# Patient Record
Sex: FEMALE | Race: AMERICAN INDIAN OR ALASKA NATIVE | URBAN - METROPOLITAN AREA
[De-identification: names, ages, dates, MRNs, and addresses within clinical notes are randomized per-mention and may not be internally consistent; named-entity substitution may affect disease eponyms.]

---

## 2018-05-02 ENCOUNTER — OFFICE VISIT (OUTPATIENT)
Dept: URGENT CARE | Facility: URGENT CARE | Age: 23
End: 2018-05-02
Payer: COMMERCIAL

## 2018-05-02 VITALS
OXYGEN SATURATION: 98 % | DIASTOLIC BLOOD PRESSURE: 74 MMHG | SYSTOLIC BLOOD PRESSURE: 114 MMHG | HEART RATE: 111 BPM | RESPIRATION RATE: 16 BRPM | TEMPERATURE: 97.7 F

## 2018-05-02 DIAGNOSIS — M79.89 LEFT ARM SWELLING: ICD-10-CM

## 2018-05-02 DIAGNOSIS — T63.441A ALLERGIC REACTION TO BEE STING: Primary | ICD-10-CM

## 2018-05-02 PROCEDURE — 99214 OFFICE O/P EST MOD 30 MIN: CPT | Performed by: FAMILY MEDICINE

## 2018-05-02 RX ORDER — PREDNISONE 20 MG/1
40 TABLET ORAL DAILY
Qty: 8 TABLET | Refills: 0 | Status: SHIPPED | OUTPATIENT
Start: 2018-05-03 | End: 2018-05-07

## 2018-05-02 RX ORDER — DOXYCYCLINE 100 MG/1
CAPSULE ORAL 2 TIMES DAILY
COMMUNITY
End: 2018-12-28

## 2018-05-02 RX ORDER — DIPHENHYDRAMINE HCL 25 MG
25 TABLET ORAL ONCE
Qty: 1 TABLET | Refills: 0
Start: 2018-05-02 | End: 2018-05-02

## 2018-05-02 RX ORDER — PREDNISONE 20 MG/1
40 TABLET ORAL ONCE
Qty: 2 TABLET | Refills: 0
Start: 2018-05-02 | End: 2018-05-02

## 2018-05-02 NOTE — MR AVS SNAPSHOT
"              After Visit Summary   2018    Lulu Jimenez    MRN: [id number]           Patient Information     Date Of Birth          1995        Visit Information        Provider Department      2018 6:10 PM Uma Harris MD [facility] [place]        Today's Diagnoses     Allergic reaction to bee sting    -  1    Left arm swelling           Follow-ups after your visit        Who to contact     If you have questions or need follow up information about today's clinic visit or your schedule please contact [facility] directly at [phone].  Normal or non-critical lab and imaging results will be communicated to you by echoechohart, letter or phone within 4 business days after the clinic has received the results. If you do not hear from us within 7 days, please contact the clinic through echoechohart or phone. If you have a critical or abnormal lab result, we will notify you by phone as soon as possible.  Submit refill requests through [facility] or call your pharmacy and they will forward the refill request to us. Please allow 3 business days for your refill to be completed.          Additional Information About Your Visit        MyChart Information     [facility] lets you send messages to your doctor, view your test results, renew your prescriptions, schedule appointments and more. To sign up, go to [email or web address] . Click on \"Log in\" on the left side of the screen, which will take you to the Welcome page. Then click on \"Sign up Now\" on the right side of the page.     You will be asked to enter the access code listed below, as well as some personal information. Please follow the directions to create your username and password.     Your access code is: [email or web address]  Expires: 2018  7:28 PM     Your access code will  in 90 days. If you need help or a new code, please call your [facility] or [phone].        Care EveryWhere ID     This is your Care " EveryWhere ID. This could be used by other organizations to access your [place] medical records  [id number]        Your Vitals Were     Pulse Temperature Respirations Pulse Oximetry          111 97.7  F (36.5  C) (Oral) 16 98%         Blood Pressure from Last 3 Encounters:   05/02/18 114/74   04/15/14 115/76   03/31/12 100/60    Weight from Last 3 Encounters:   04/15/14 108 lb 3.9 oz (49.1 kg)   08/09/12 107 lb (48.5 kg) (12 %)*   03/31/12 102 lb (46.3 kg) (6 %)*     * Growth percentiles are based on [facility] 2-20 Years data.              Today, you had the following     No orders found for display         Today's Medication Changes          These changes are accurate as of 5/2/18  7:28 PM.  If you have any questions, ask your nurse or doctor.               Start taking these medicines.        Dose/Directions    diphenhydrAMINE 25 MG tablet   Commonly known as:  BENADRYL   Used for:  Allergic reaction to bee sting   Started by:  Uma Harris MD        Dose:  25 mg   Take 1 tablet (25 mg) by mouth once for 1 dose   Quantity:  1 tablet   Refills:  0       * predniSONE 20 MG tablet   Commonly known as:  DELTASONE   Used for:  Allergic reaction to bee sting   Started by:  Uma Harris MD        Dose:  40 mg   Take 2 tablets (40 mg) by mouth once for 1 dose   Quantity:  2 tablet   Refills:  0       * predniSONE 20 MG tablet   Commonly known as:  DELTASONE   Used for:  Allergic reaction to bee sting   Started by:  Uma Harris MD        Dose:  40 mg   Start taking on:  5/3/2018   Take 2 tablets (40 mg) by mouth daily for 4 days   Quantity:  8 tablet   Refills:  0       * Notice:  This list has 2 medication(s) that are the same as other medications prescribed for you. Read the directions carefully, and ask your doctor or other care provider to review them with you.         Where to get your medicines      These medications were [id number]   [address], [place] [address]     Phone:  [phone]     predniSONE 20 MG tablet         Some of these will need a paper prescription and others can be bought over the counter.  Ask your nurse if you have questions.     You don't need a prescription for these medications     diphenhydrAMINE 25 MG tablet    predniSONE 20 MG tablet                Primary Care Provider Office Phone # Fax #    Sanam Lemon [phone]       [facility] [address] [place] [address] [address]  [place] [address]        Equal Access to Services     [facility]: Hadii aad ku hadasho Soomaali, waaxda luqadaha, qaybta kaalmada adeegyada, waxay idiin hayaan nguyễn dueñas . So [facility] [phone].    ATENCIÓN: Si habla español, tiene a roach disposición servicios gratuitos de asistencia lingüística. [facility] [phone].    We comply with applicable federal civil rights laws and [place] laws. We do not discriminate on the basis of race, color, national origin, age, disability, sex, sexual orientation, or gender identity.            Thank you!     Thank you for choosing [facility]  for your care. Our goal is always to provide you with excellent care. Hearing back from our patients is one way we can continue to improve our services. Please take a few minutes to complete the written survey that you may receive in the mail after your visit with us. Thank you!             Your Updated Medication List - Protect others around you: Learn how to safely use, store and throw away your medicines at www.disposemymeds.org.          This list is accurate as of 5/2/18  7:28 PM.  Always use your most recent med list.                   Brand Name Dispense Instructions for use Diagnosis    diphenhydrAMINE 25 MG tablet    BENADRYL    1 tablet    Take 1 tablet (25 mg) by mouth once for 1 dose    Allergic reaction to bee sting       doxycycline 100 MG capsule    VIBRAMYCIN     Take by mouth 2  times daily        NUVARING 0.12-0.015 MG/24HR vaginal ring   Generic drug:  etonogestrel-ethinyl estradiol      Place 1 each vaginally every 28 days        * predniSONE 20 MG tablet    DELTASONE    2 tablet    Take 2 tablets (40 mg) by mouth once for 1 dose    Allergic reaction to bee sting       * predniSONE 20 MG tablet   Start taking on:  5/3/2018    DELTASONE    8 tablet    Take 2 tablets (40 mg) by mouth daily for 4 days    Allergic reaction to bee sting       * Notice:  This list has 2 medication(s) that are the same as other medications prescribed for you. Read the directions carefully, and ask your doctor or other care provider to review them with you.

## 2018-05-03 NOTE — PROGRESS NOTES
Chief Complaint   Patient presents with     Urgent Care     Bee sting on Sunday.  Pt c/o redness and swelling left arm     SUBJECTIVE:  Lulu Jimenez, a 22 year old female scheduled an appointment to discuss the following issues:     Allergic reaction to bee sting  Left arm swelling     She is here as was stung by a bee 3 days back in the left arm inner aspect following which she has swelling and redness in her left arm and left forearm area along with left elbow swelling too  She has been noticing a lot of itching and also swelling in the area   Denies any fever or chills , was seen in [facility] clinic yesterday and started on doxycycline   She has been taking it for last 1 day but has not been taking benadryl every 4-6 hrs  Discussed with pt to start taking benadryl every 4-6 hrs   Started on prednisone , was given a dose in the UC along with benadryl     No past medical history on file.   No past surgical history on file.     Social History     Social History     Marital status: Single     Spouse name: N/A     Number of children: N/A     Years of education: N/A     Occupational History     Not on file.     Social History Main Topics     Smoking status: Never Smoker     Smokeless tobacco: Never Used     Alcohol use Not on file     Drug use: Not on file     Sexual activity: Not on file     Other Topics Concern     Not on file     Social History Narrative        Current Outpatient Prescriptions   Medication Sig Dispense Refill     diphenhydrAMINE (BENADRYL) 25 MG tablet Take 1 tablet (25 mg) by mouth once for 1 dose 1 tablet 0     doxycycline (VIBRAMYCIN) 100 MG capsule Take by mouth 2 times daily       etonogestrel-ethinyl estradiol (NUVARING) 0.12-0.015 MG/24HR vaginal ring Place 1 each vaginally every 28 days       [START ON 5/3/2018] predniSONE (DELTASONE) 20 MG tablet Take 2 tablets (40 mg) by mouth daily for 4 days 8 tablet 0     predniSONE (DELTASONE) 20 MG tablet Take 2 tablets (40 mg) by mouth once for 1  dose 2 tablet 0       Health Maintenance   Topic Date Due     CHLAMYDIA SCREENING  1995     HPV IMMUNIZATION (1 of 3 - Female 3 Dose Series) 07/12/2004     TETANUS IMMUNIZATION (SYSTEM ASSIGNED)  07/12/2011     HIV SCREEN (SYSTEM ASSIGNED)  07/12/2011     PAP SCREENING Q3 YR (SYSTEM ASSIGNED)  07/12/2014     INFLUENZA VACCINE (Season Ended) 09/01/2018        ROS:  CONSTITUTIONAL:no fever, no chills or sweats, no excessive fatigue, no significant change in weight  CV: neg   RESP -neg  GI:  Neg   NEURO: neg   MSK - neg   Skin - neg   Pyschiatry-neg     OBJECTIVE:  /74  Pulse 111  Temp 97.7  F (36.5  C) (Oral)  Resp 16  SpO2 98%      EXAM:  GENERAL APPEARANCE: healthy, alert and no distress  EYES: EOMI,  PERRL  HENT: ear canals and TM's normal and nose and mouth without ulcers or lesions  RESP: lungs clear to auscultation - no rales, rhonchi or wheezes  CV: regular rates and rhythm, normal S1 S2, no S3 or S4 and no murmur, click or rub -  ABDOMEN:  soft, nontender, no HSM or masses and bowel sounds normal  SKIN: left arm -extensive swelling with redness with no streaking noted , there is associated warmth noted , the redness extends to the palmar aspect of the forearm and also the left elbow lateral aspect   PSYCH: mentation appears normal and affect normal/bright        ASSESSMENT/PLAN:  Lulu was seen today for urgent care.    Diagnoses and all orders for this visit:    Allergic reaction to bee sting  -     predniSONE (DELTASONE) 20 MG tablet; Take 2 tablets (40 mg) by mouth daily for 4 days  -     predniSONE (DELTASONE) 20 MG tablet; Take 2 tablets (40 mg) by mouth once for 1 dose  -     diphenhydrAMINE (BENADRYL) 25 MG tablet; Take 1 tablet (25 mg) by mouth once for 1 dose    Left arm swelling      Discussed with pt that the symptoms seem to be related to allergic reaction   If swelling and redness worsens should follow up   Advised to continue on benadryl 12.5 mg every 4-6 hrs   Zyrtec during the  day   Continue on doxycycline started at the minute clinic   Apply calamine lotion to area   Follow up if  symptoms fail to improve or worsens   Pt understood and agreed with plan       Spent>25 minutes with patient and > 50% of the time was for counselling       Uma Harris MD

## 2018-12-03 ENCOUNTER — DOCUMENTATION ONLY (OUTPATIENT)
Dept: PEDIATRIC HEMATOLOGY/ONCOLOGY | Facility: CLINIC | Age: 23
End: 2018-12-03

## 2018-12-03 NOTE — TELEPHONE ENCOUNTER
Records from [facility] were sent to [facility] to be scanned into the patient's chart.    Osiris Rasheed CMA December 3, 2018

## 2018-12-28 ENCOUNTER — OFFICE VISIT (OUTPATIENT)
Dept: FAMILY MEDICINE | Facility: CLINIC | Age: 23
End: 2018-12-28
Payer: COMMERCIAL

## 2018-12-28 VITALS
HEIGHT: 59 IN | BODY MASS INDEX: 21.75 KG/M2 | SYSTOLIC BLOOD PRESSURE: 128 MMHG | WEIGHT: 107.9 LBS | DIASTOLIC BLOOD PRESSURE: 76 MMHG | TEMPERATURE: 99.1 F | HEART RATE: 107 BPM | OXYGEN SATURATION: 97 %

## 2018-12-28 DIAGNOSIS — H61.21 IMPACTED CERUMEN OF RIGHT EAR: ICD-10-CM

## 2018-12-28 DIAGNOSIS — J06.9 VIRAL URI WITH COUGH: Primary | ICD-10-CM

## 2018-12-28 PROCEDURE — 69209 REMOVE IMPACTED EAR WAX UNI: CPT | Mod: RT | Performed by: NURSE PRACTITIONER

## 2018-12-28 PROCEDURE — 99212 OFFICE O/P EST SF 10 MIN: CPT | Mod: 25 | Performed by: NURSE PRACTITIONER

## 2018-12-28 ASSESSMENT — MIFFLIN-ST. JEOR: SCORE: 1144.02

## 2018-12-28 NOTE — PROGRESS NOTES
"HPI    SUBJECTIVE:   Lulu Jimenez is a 23 year old female who presents to clinic today for the following health issues:      RESPIRATORY SYMPTOMS      Duration: 1 week    Description  nasal congestion, rhinorrhea, facial pain/pressure, cough, fever and ear pain right    Severity: moderate    Accompanying signs and symptoms: None    History (predisposing factors):  none    Precipitating or alleviating factors: None    Therapies tried and outcome:  rest and fluids      Had a cold last week, got better then got bad again this last weekend 6 days ago   Coughing with some phlgem   Still has some throat pain   No fevers anymore   Has some sick contacts     No past medical history on file.  No family history on file.  No past surgical history on file.  Social History     Tobacco Use     Smoking status: Never Smoker     Smokeless tobacco: Never Used   Substance Use Topics     Alcohol use: Not on file     Current Outpatient Medications   Medication Sig Dispense Refill     etonogestrel-ethinyl estradiol (NUVARING) 0.12-0.015 MG/24HR vaginal ring Place 1 each vaginally every 28 days       Allergies   Allergen Reactions     Bactrim [Sulfamethoxazole W-Trimethoprim]      Rash and dyspnea.       Reviewed and updated as needed this visit by clinical staff and provider       ROS  Detailed as above     /76 (BP Location: Right arm, Patient Position: Chair, Cuff Size: Adult Regular)   Pulse 107   Temp 99.1  F (37.3  C) (Tympanic)   Ht 1.505 m (4' 11.25\")   Wt 48.9 kg (107 lb 14.4 oz)   LMP 12/23/2018   SpO2 97%   BMI 21.61 kg/m        Physical Exam   Constitutional: She is oriented to person, place, and time. She appears well-developed.   HENT:   Head: Normocephalic.   Right Ear: Tympanic membrane, external ear and ear canal normal.   Left Ear: Tympanic membrane, external ear and ear canal normal.   Mouth/Throat: Oropharynx is clear and moist. No oropharyngeal exudate.   Right cerumen impaction   Eyes: Conjunctivae " are normal.   Neck: Normal range of motion.   Cardiovascular: Normal rate, regular rhythm and normal heart sounds.   No murmur heard.  Pulmonary/Chest: Effort normal and breath sounds normal. No respiratory distress.   Lymphadenopathy:     She has no cervical adenopathy.   Neurological: She is alert and oriented to person, place, and time.   Skin: Skin is warm and dry.   Psychiatric: She has a normal mood and affect. Judgment normal.       Assessment and Plan:       ICD-10-CM    1. Viral URI with cough J06.9     B97.89    2. Impacted cerumen of right ear H61.21 HC REMOVAL IMPACTED CERUMEN IRRIGATION/LVG UNILAT       Looks great on exam. Suspect viral etiology. Discussed Symptomatic treatments   CMA completed cerumen removal by lavage     ELISA Weiner, CNP  [facility]

## 2019-01-02 NOTE — NURSING NOTE
Patient identified using two patient identifiers.  Ear exam showing wax occlusion completed by provider.  Solution: warm water was placed in the right ear(s) via Irrigation tool   Kimberly Kaufman CMA  .

## 2019-02-15 ENCOUNTER — HEALTH MAINTENANCE LETTER (OUTPATIENT)
Age: 24
End: 2019-02-15

## 2019-11-08 ENCOUNTER — HEALTH MAINTENANCE LETTER (OUTPATIENT)
Age: 24
End: 2019-11-08

## 2020-02-23 ENCOUNTER — HEALTH MAINTENANCE LETTER (OUTPATIENT)
Age: 25
End: 2020-02-23

## 2020-12-06 ENCOUNTER — HEALTH MAINTENANCE LETTER (OUTPATIENT)
Age: 25
End: 2020-12-06

## 2021-09-25 ENCOUNTER — HEALTH MAINTENANCE LETTER (OUTPATIENT)
Age: 26
End: 2021-09-25

## 2022-01-15 ENCOUNTER — HEALTH MAINTENANCE LETTER (OUTPATIENT)
Age: 27
End: 2022-01-15

## 2022-08-08 ENCOUNTER — LAB REQUISITION (OUTPATIENT)
Dept: LAB | Facility: CLINIC | Age: 27
End: 2022-08-08
Payer: COMMERCIAL

## 2022-08-08 DIAGNOSIS — Z31.69 ENCOUNTER FOR OTHER GENERAL COUNSELING AND ADVICE ON PROCREATION: ICD-10-CM

## 2022-08-08 DIAGNOSIS — Z01.419 ENCOUNTER FOR GYNECOLOGICAL EXAMINATION (GENERAL) (ROUTINE) WITHOUT ABNORMAL FINDINGS: ICD-10-CM

## 2022-08-08 PROCEDURE — 86787 VARICELLA-ZOSTER ANTIBODY: CPT | Mod: ORL | Performed by: PHYSICIAN ASSISTANT

## 2022-08-08 PROCEDURE — G0145 SCR C/V CYTO,THINLAYER,RESCR: HCPCS | Mod: ORL | Performed by: PHYSICIAN ASSISTANT

## 2022-08-08 PROCEDURE — G0124 SCREEN C/V THIN LAYER BY MD: HCPCS | Performed by: PATHOLOGY

## 2022-08-08 PROCEDURE — 86762 RUBELLA ANTIBODY: CPT | Mod: ORL | Performed by: PHYSICIAN ASSISTANT

## 2022-08-09 LAB
RUBV IGG SERPL QL IA: 0.83 INDEX
RUBV IGG SERPL QL IA: NORMAL
VZV IGG SER QL IA: 779 INDEX
VZV IGG SER QL IA: POSITIVE

## 2022-08-12 LAB
BKR LAB AP GYN ADEQUACY: ABNORMAL
BKR LAB AP GYN INTERPRETATION: ABNORMAL
BKR LAB AP HPV REFLEX: ABNORMAL
BKR LAB AP LMP: ABNORMAL
BKR LAB AP PREVIOUS ABNL DX: ABNORMAL
BKR LAB AP PREVIOUS ABNORMAL: ABNORMAL
PATH REPORT.COMMENTS IMP SPEC: ABNORMAL
PATH REPORT.COMMENTS IMP SPEC: ABNORMAL
PATH REPORT.RELEVANT HX SPEC: ABNORMAL

## 2022-09-02 ENCOUNTER — LAB REQUISITION (OUTPATIENT)
Dept: LAB | Facility: CLINIC | Age: 27
End: 2022-09-02
Payer: COMMERCIAL

## 2022-09-02 DIAGNOSIS — R87.610 ATYPICAL SQUAMOUS CELLS OF UNDETERMINED SIGNIFICANCE ON CYTOLOGIC SMEAR OF CERVIX (ASC-US): ICD-10-CM

## 2022-09-02 PROCEDURE — 88305 TISSUE EXAM BY PATHOLOGIST: CPT | Mod: 26 | Performed by: PATHOLOGY

## 2022-09-02 PROCEDURE — 88305 TISSUE EXAM BY PATHOLOGIST: CPT | Mod: TC,ORL | Performed by: OBSTETRICS & GYNECOLOGY

## 2022-09-07 LAB
PATH REPORT.COMMENTS IMP SPEC: NORMAL
PATH REPORT.COMMENTS IMP SPEC: NORMAL
PATH REPORT.FINAL DX SPEC: NORMAL
PATH REPORT.GROSS SPEC: NORMAL
PATH REPORT.MICROSCOPIC SPEC OTHER STN: NORMAL
PATH REPORT.RELEVANT HX SPEC: NORMAL
PHOTO IMAGE: NORMAL

## 2023-01-07 ENCOUNTER — HEALTH MAINTENANCE LETTER (OUTPATIENT)
Age: 28
End: 2023-01-07

## 2023-04-22 ENCOUNTER — HEALTH MAINTENANCE LETTER (OUTPATIENT)
Age: 28
End: 2023-04-22

## 2023-05-16 ENCOUNTER — LAB REQUISITION (OUTPATIENT)
Dept: LAB | Facility: CLINIC | Age: 28
End: 2023-05-16
Payer: COMMERCIAL
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
## 2023-05-16 PROCEDURE — 86618 LYME DISEASE ANTIBODY: CPT | Mod: ORL | Performed by: PHYSICIAN ASSISTANT

## 2023-05-17 LAB — B BURGDOR IGG+IGM SER QL: 0.35

## 2023-06-13 LAB
HEPATITIS B SURFACE ANTIGEN (EXTERNAL): NEGATIVE
RUBELLA ANTIBODY IGG (EXTERNAL): NORMAL

## 2023-08-13 LAB — HIV1+2 AB SERPL QL IA: NEGATIVE

## 2023-11-27 LAB — GROUP B STREPTOCOCCUS (EXTERNAL): NEGATIVE

## 2023-12-10 ENCOUNTER — HOSPITAL ENCOUNTER (INPATIENT)
Facility: CLINIC | Age: 28
LOS: 5 days | Discharge: HOME HEALTH | End: 2023-12-15
Attending: OBSTETRICS & GYNECOLOGY | Admitting: OBSTETRICS & GYNECOLOGY
Payer: COMMERCIAL

## 2023-12-10 DIAGNOSIS — R60.0 BILATERAL LOWER EXTREMITY EDEMA: ICD-10-CM

## 2023-12-10 DIAGNOSIS — Z98.891 STATUS POST PRIMARY LOW TRANSVERSE CESAREAN SECTION: ICD-10-CM

## 2023-12-10 PROBLEM — Z34.90 ENCOUNTER FOR INDUCTION OF LABOR: Status: ACTIVE | Noted: 2023-12-10

## 2023-12-10 LAB
ABO/RH(D): NORMAL
ANTIBODY SCREEN: NEGATIVE
ERYTHROCYTE [DISTWIDTH] IN BLOOD BY AUTOMATED COUNT: 12.6 % (ref 10–15)
HCT VFR BLD AUTO: 34 % (ref 35–47)
HGB BLD-MCNC: 11.7 G/DL (ref 11.7–15.7)
MCH RBC QN AUTO: 33 PG (ref 26.5–33)
MCHC RBC AUTO-ENTMCNC: 34.4 G/DL (ref 31.5–36.5)
MCV RBC AUTO: 96 FL (ref 78–100)
PLATELET # BLD AUTO: 185 10E3/UL (ref 150–450)
RBC # BLD AUTO: 3.55 10E6/UL (ref 3.8–5.2)
SPECIMEN EXPIRATION DATE: NORMAL
WBC # BLD AUTO: 6.8 10E3/UL (ref 4–11)

## 2023-12-10 PROCEDURE — 36415 COLL VENOUS BLD VENIPUNCTURE: CPT | Performed by: OBSTETRICS & GYNECOLOGY

## 2023-12-10 PROCEDURE — 120N000001 HC R&B MED SURG/OB

## 2023-12-10 PROCEDURE — 86780 TREPONEMA PALLIDUM: CPT | Performed by: OBSTETRICS & GYNECOLOGY

## 2023-12-10 PROCEDURE — 250N000013 HC RX MED GY IP 250 OP 250 PS 637: Performed by: OBSTETRICS & GYNECOLOGY

## 2023-12-10 PROCEDURE — 85027 COMPLETE CBC AUTOMATED: CPT | Performed by: OBSTETRICS & GYNECOLOGY

## 2023-12-10 PROCEDURE — 86901 BLOOD TYPING SEROLOGIC RH(D): CPT | Performed by: OBSTETRICS & GYNECOLOGY

## 2023-12-10 PROCEDURE — 86850 RBC ANTIBODY SCREEN: CPT | Performed by: OBSTETRICS & GYNECOLOGY

## 2023-12-10 RX ORDER — KETOROLAC TROMETHAMINE 30 MG/ML
30 INJECTION, SOLUTION INTRAMUSCULAR; INTRAVENOUS
Status: DISCONTINUED | OUTPATIENT
Start: 2023-12-10 | End: 2023-12-12

## 2023-12-10 RX ORDER — NALOXONE HYDROCHLORIDE 0.4 MG/ML
0.4 INJECTION, SOLUTION INTRAMUSCULAR; INTRAVENOUS; SUBCUTANEOUS
Status: DISCONTINUED | OUTPATIENT
Start: 2023-12-10 | End: 2023-12-12 | Stop reason: HOSPADM

## 2023-12-10 RX ORDER — ONDANSETRON 2 MG/ML
4 INJECTION INTRAMUSCULAR; INTRAVENOUS EVERY 6 HOURS PRN
Status: DISCONTINUED | OUTPATIENT
Start: 2023-12-10 | End: 2023-12-12 | Stop reason: HOSPADM

## 2023-12-10 RX ORDER — NALOXONE HYDROCHLORIDE 0.4 MG/ML
0.2 INJECTION, SOLUTION INTRAMUSCULAR; INTRAVENOUS; SUBCUTANEOUS
Status: DISCONTINUED | OUTPATIENT
Start: 2023-12-10 | End: 2023-12-12 | Stop reason: HOSPADM

## 2023-12-10 RX ORDER — TRANEXAMIC ACID 10 MG/ML
1 INJECTION, SOLUTION INTRAVENOUS EVERY 30 MIN PRN
Status: DISCONTINUED | OUTPATIENT
Start: 2023-12-10 | End: 2023-12-12 | Stop reason: HOSPADM

## 2023-12-10 RX ORDER — ONDANSETRON 4 MG/1
4 TABLET, ORALLY DISINTEGRATING ORAL EVERY 6 HOURS PRN
Status: DISCONTINUED | OUTPATIENT
Start: 2023-12-10 | End: 2023-12-12 | Stop reason: HOSPADM

## 2023-12-10 RX ORDER — OXYTOCIN/0.9 % SODIUM CHLORIDE 30/500 ML
100-340 PLASTIC BAG, INJECTION (ML) INTRAVENOUS CONTINUOUS PRN
Status: DISCONTINUED | OUTPATIENT
Start: 2023-12-10 | End: 2023-12-12

## 2023-12-10 RX ORDER — OXYTOCIN/0.9 % SODIUM CHLORIDE 30/500 ML
340 PLASTIC BAG, INJECTION (ML) INTRAVENOUS CONTINUOUS PRN
Status: DISCONTINUED | OUTPATIENT
Start: 2023-12-10 | End: 2023-12-12 | Stop reason: HOSPADM

## 2023-12-10 RX ORDER — MISOPROSTOL 200 UG/1
400 TABLET ORAL
Status: DISCONTINUED | OUTPATIENT
Start: 2023-12-10 | End: 2023-12-12 | Stop reason: HOSPADM

## 2023-12-10 RX ORDER — MISOPROSTOL 200 UG/1
800 TABLET ORAL
Status: DISCONTINUED | OUTPATIENT
Start: 2023-12-10 | End: 2023-12-12 | Stop reason: HOSPADM

## 2023-12-10 RX ORDER — DIPHENHYDRAMINE HCL 25 MG
25 CAPSULE ORAL EVERY 6 HOURS PRN
COMMUNITY

## 2023-12-10 RX ORDER — PROCHLORPERAZINE MALEATE 5 MG
10 TABLET ORAL EVERY 6 HOURS PRN
Status: DISCONTINUED | OUTPATIENT
Start: 2023-12-10 | End: 2023-12-12 | Stop reason: HOSPADM

## 2023-12-10 RX ORDER — CARBOPROST TROMETHAMINE 250 UG/ML
250 INJECTION, SOLUTION INTRAMUSCULAR
Status: DISCONTINUED | OUTPATIENT
Start: 2023-12-10 | End: 2023-12-12 | Stop reason: HOSPADM

## 2023-12-10 RX ORDER — HYDROXYZINE HYDROCHLORIDE 25 MG/1
50 TABLET, FILM COATED ORAL
Status: DISCONTINUED | OUTPATIENT
Start: 2023-12-10 | End: 2023-12-12 | Stop reason: HOSPADM

## 2023-12-10 RX ORDER — OXYTOCIN 10 [USP'U]/ML
10 INJECTION, SOLUTION INTRAMUSCULAR; INTRAVENOUS
Status: DISCONTINUED | OUTPATIENT
Start: 2023-12-10 | End: 2023-12-12

## 2023-12-10 RX ORDER — METOCLOPRAMIDE 10 MG/1
10 TABLET ORAL EVERY 6 HOURS PRN
Status: DISCONTINUED | OUTPATIENT
Start: 2023-12-10 | End: 2023-12-12 | Stop reason: HOSPADM

## 2023-12-10 RX ORDER — FERROUS SULFATE 325(65) MG
325 TABLET ORAL
Status: ON HOLD | COMMUNITY
End: 2023-12-15

## 2023-12-10 RX ORDER — CITRIC ACID/SODIUM CITRATE 334-500MG
30 SOLUTION, ORAL ORAL
Status: DISCONTINUED | OUTPATIENT
Start: 2023-12-10 | End: 2023-12-12 | Stop reason: HOSPADM

## 2023-12-10 RX ORDER — METHYLERGONOVINE MALEATE 0.2 MG/ML
200 INJECTION INTRAVENOUS
Status: DISCONTINUED | OUTPATIENT
Start: 2023-12-10 | End: 2023-12-12 | Stop reason: HOSPADM

## 2023-12-10 RX ORDER — CETIRIZINE HYDROCHLORIDE 5 MG/1
5 TABLET ORAL DAILY
COMMUNITY

## 2023-12-10 RX ORDER — METOCLOPRAMIDE HYDROCHLORIDE 5 MG/ML
10 INJECTION INTRAMUSCULAR; INTRAVENOUS EVERY 6 HOURS PRN
Status: DISCONTINUED | OUTPATIENT
Start: 2023-12-10 | End: 2023-12-12 | Stop reason: HOSPADM

## 2023-12-10 RX ORDER — PROCHLORPERAZINE 25 MG
25 SUPPOSITORY, RECTAL RECTAL EVERY 12 HOURS PRN
Status: DISCONTINUED | OUTPATIENT
Start: 2023-12-10 | End: 2023-12-12 | Stop reason: HOSPADM

## 2023-12-10 RX ORDER — FENTANYL CITRATE 50 UG/ML
50 INJECTION, SOLUTION INTRAMUSCULAR; INTRAVENOUS EVERY 30 MIN PRN
Status: DISCONTINUED | OUTPATIENT
Start: 2023-12-10 | End: 2023-12-12 | Stop reason: HOSPADM

## 2023-12-10 RX ORDER — ACETAMINOPHEN 325 MG/1
650 TABLET ORAL EVERY 4 HOURS PRN
Status: DISCONTINUED | OUTPATIENT
Start: 2023-12-10 | End: 2023-12-12

## 2023-12-10 RX ORDER — IBUPROFEN 400 MG/1
800 TABLET, FILM COATED ORAL
Status: DISCONTINUED | OUTPATIENT
Start: 2023-12-10 | End: 2023-12-12

## 2023-12-10 RX ORDER — VITAMIN A ACETATE, .BETA.-CAROTENE, ASCORBIC ACID, CHOLECALCIFEROL, .ALPHA.-TOCOPHEROL ACETATE, DL-, THIAMINE MONONITRATE, RIBOFLAVIN, NIACINAMIDE, PYRIDOXINE HYDROCHLORIDE, FOLIC ACID, CYANOCOBALAMIN, CALCIUM CARBONATE, FERROUS FUMARATE, ZINC OXIDE, AND CUPRIC OXIDE 2000; 2000; 120; 400; 22; 1.84; 3; 20; 10; 1; 12; 200; 27; 25; 2 [IU]/1; [IU]/1; MG/1; [IU]/1; MG/1; MG/1; MG/1; MG/1; MG/1; MG/1; UG/1; MG/1; MG/1; MG/1; MG/1
1 TABLET ORAL DAILY
COMMUNITY

## 2023-12-10 RX ORDER — OXYTOCIN 10 [USP'U]/ML
10 INJECTION, SOLUTION INTRAMUSCULAR; INTRAVENOUS
Status: DISCONTINUED | OUTPATIENT
Start: 2023-12-10 | End: 2023-12-12 | Stop reason: HOSPADM

## 2023-12-10 RX ADMIN — DINOPROSTONE 10 MG: 10 INSERT VAGINAL at 21:24

## 2023-12-10 RX ADMIN — HYDROXYZINE HYDROCHLORIDE 50 MG: 25 TABLET, FILM COATED ORAL at 22:17

## 2023-12-10 RX ADMIN — ACETAMINOPHEN 650 MG: 325 TABLET, FILM COATED ORAL at 22:46

## 2023-12-10 ASSESSMENT — ACTIVITIES OF DAILY LIVING (ADL)
ADLS_ACUITY_SCORE: 18
ADLS_ACUITY_SCORE: 18

## 2023-12-11 LAB — T PALLIDUM AB SER QL: NONREACTIVE

## 2023-12-11 PROCEDURE — 258N000003 HC RX IP 258 OP 636: Performed by: OBSTETRICS & GYNECOLOGY

## 2023-12-11 PROCEDURE — 120N000001 HC R&B MED SURG/OB

## 2023-12-11 PROCEDURE — 250N000011 HC RX IP 250 OP 636: Performed by: OBSTETRICS & GYNECOLOGY

## 2023-12-11 PROCEDURE — 250N000009 HC RX 250: Performed by: OBSTETRICS & GYNECOLOGY

## 2023-12-11 PROCEDURE — 3E0P7VZ INTRODUCTION OF HORMONE INTO FEMALE REPRODUCTIVE, VIA NATURAL OR ARTIFICIAL OPENING: ICD-10-PCS | Performed by: OBSTETRICS & GYNECOLOGY

## 2023-12-11 PROCEDURE — 250N000013 HC RX MED GY IP 250 OP 250 PS 637: Performed by: OBSTETRICS & GYNECOLOGY

## 2023-12-11 RX ORDER — OXYTOCIN/0.9 % SODIUM CHLORIDE 30/500 ML
1-24 PLASTIC BAG, INJECTION (ML) INTRAVENOUS CONTINUOUS
Status: DISCONTINUED | OUTPATIENT
Start: 2023-12-11 | End: 2023-12-12 | Stop reason: HOSPADM

## 2023-12-11 RX ORDER — SODIUM CHLORIDE, SODIUM LACTATE, POTASSIUM CHLORIDE, CALCIUM CHLORIDE 600; 310; 30; 20 MG/100ML; MG/100ML; MG/100ML; MG/100ML
INJECTION, SOLUTION INTRAVENOUS CONTINUOUS PRN
Status: DISCONTINUED | OUTPATIENT
Start: 2023-12-11 | End: 2023-12-12 | Stop reason: HOSPADM

## 2023-12-11 RX ORDER — LIDOCAINE 40 MG/G
CREAM TOPICAL
Status: DISCONTINUED | OUTPATIENT
Start: 2023-12-11 | End: 2023-12-12 | Stop reason: HOSPADM

## 2023-12-11 RX ORDER — TERBUTALINE SULFATE 1 MG/ML
0.25 INJECTION, SOLUTION SUBCUTANEOUS
Status: DISCONTINUED | OUTPATIENT
Start: 2023-12-11 | End: 2023-12-12 | Stop reason: HOSPADM

## 2023-12-11 RX ADMIN — Medication 2 MILLI-UNITS/MIN: at 10:18

## 2023-12-11 RX ADMIN — HYDROXYZINE HYDROCHLORIDE 50 MG: 25 TABLET, FILM COATED ORAL at 01:44

## 2023-12-11 RX ADMIN — SODIUM CHLORIDE, POTASSIUM CHLORIDE, SODIUM LACTATE AND CALCIUM CHLORIDE 75 ML/HR: 600; 310; 30; 20 INJECTION, SOLUTION INTRAVENOUS at 10:26

## 2023-12-11 RX ADMIN — SODIUM CHLORIDE, POTASSIUM CHLORIDE, SODIUM LACTATE AND CALCIUM CHLORIDE: 600; 310; 30; 20 INJECTION, SOLUTION INTRAVENOUS at 21:00

## 2023-12-11 RX ADMIN — FENTANYL CITRATE 50 MCG: 50 INJECTION, SOLUTION INTRAMUSCULAR; INTRAVENOUS at 22:47

## 2023-12-11 RX ADMIN — FENTANYL CITRATE 50 MCG: 50 INJECTION, SOLUTION INTRAMUSCULAR; INTRAVENOUS at 22:52

## 2023-12-11 ASSESSMENT — ACTIVITIES OF DAILY LIVING (ADL)
ADLS_ACUITY_SCORE: 18

## 2023-12-11 NOTE — PLAN OF CARE
Pt arrived at 1945 to room 212 with  Gino. Oriented to room and call light setting, placed on external monitors with consent.    Admissions questions discussed, also discussed induction process with patient. Pt in agreement with plan for cervidil. Dr Schwab stopped in to see the patient, discussed plan of care with her and answered pt and 's questions. Dr Schwab placed orders, Dr Rodriguez paged that pt had arrived.    18g PIV placed in R hand, SL. Pt being induced for GHTN. Cx pattern irregular, FHT category 1 with moderate variability, baseline HR 140s, + accels, - decels. Continuing with plan of care.

## 2023-12-11 NOTE — PROGRESS NOTES
S: breathing through contractions. Using Susannah for fetal monitoring, pt up and moving around     O:  /84   Pulse (!) 125   Temp (P) 97.7  F (36.5  C) (Temporal)   Resp 18   LMP 03/17/2023     SVE 2/50/-2/posterior/soft    Cook catheter placed. 80 ml fluid placed in uterine balloon, 40 mL in vaginal balloon. Patient tolerated intrauterine balloon placement well    FHR category 1    [place] contractions every 2-5 minutes    Pitocin at 6 miU/min    A/P: 28 year old G1 at 38w3d, IOL for GHTN     S/p cervidil, now with cook catheter. Will continue with low dose pitocin as per protocol    Jennifer L. Schwab, MD

## 2023-12-11 NOTE — PROVIDER NOTIFICATION
12/10/23 2043   Provider Notification   Provider Name/Title Dr Rodriguez   Method of Notification Electronic Page   Request Evaluate - Remote   Notification Reason Patient Arrived     Lulu arrived for CR of Schwab's. Plan for cervidil, Dr Schwab placed orders. No need to call, I will keep you updated. Thanks!

## 2023-12-11 NOTE — PROGRESS NOTES
S: starting to feel uncomfortable with contractions    O:  /62 (BP Location: Left arm, Patient Position: Supine, Cuff Size: Adult Regular)   Pulse (!) 125   Temp 97.9  F (36.6  C) (Temporal)   Resp 16   LMP 03/17/2023     SVE: cervidil removed. 1.5/50%/-2/posterior/soft    FHR: 130 moderate variability +accelerations no decelerations    Contractions: every 2-5 minutes    A/P: 28 year old G1 at 38w3d, IOL for GHTN    S/p cervidil, will start pitocin as per protocol    Reviewed options for pain control.     Reactive FHR    Normotensive     Jennifer L. Schwab, MD

## 2023-12-11 NOTE — H&P
"  2023    Lulu Trujillo  [id number]            OB Admit History & Physical      Ms. Barbara Trujillo  is here for induction of labor for gestational hypertension.    She has noticed no contractions, LOF or VB. Good FM.  She feels well.    Patient's last menstrual period was 2023.   Her Estimated Date of Delivery: Dec 22, 2023  , making her 38w3d  wks.      Estimated body mass index is 21.61 kg/m  as calculated from the following:    Height as of 18: 1.505 m (4' 11.25\").    Weight as of 18: 48.9 kg (107 lb 14.4 oz).  Her prenatal course has been relatively uncomplicated. Late transfer of care from [facility] due to gestational hypertension. Repeat labs at [facility] [place] last week notable for borderline elevated UPC of 0.292, otherwise normal. Her BP has  Been nortomotensive to mildly elevated with diastolics in the 90s.    See prenatal for labs.   GBBS, Rubella Immune, RH positive    Estimated fetal weight= 3300 gm       She is a 28 year old   Her OB history:   OB History    Para Term  AB Living   1 0 0 0 0 0   SAB IAB Ectopic Multiple Live Births   0 0 0 0 0      # Outcome Date GA Lbr Alexi/2nd Weight Sex Delivery Anes PTL Lv   1 Current                   History reviewed. No pertinent past medical history.       Past Surgical History:   Procedure Laterality Date     ADENOIDECTOMY      4 years old     RHINOPLASTY      age 14         No current outpatient medications on file.       Allergies: Bactrim [sulfamethoxazole w-trimethoprim]      REVIEW OF SYSTEMS:  NEUROLOGIC:  Negative  EYES:  Negative  ENT:  Negative  GI:  Negative  BREAST:  Negative  :  Negative  GYN:  Negative  CV:  Negative  PULMONARY:  Negative  MUSCULOSKELETAL:  Negative  PSYCH:  Negative        Social History     Socioeconomic History     Marital status: Single     Spouse name: Not on file     Number of children: Not on file     Years of education: Not on file     Highest " education level: Not on file   Occupational History     Not on file   Tobacco Use     Smoking status: Never     Smokeless tobacco: Never   Substance and Sexual Activity     Alcohol use: Not Currently     Drug use: Never     Sexual activity: Yes     Partners: Male   Other Topics Concern     Not on file   Social History Narrative     Not on file     Social Determinants of Health     Financial Resource Strain: Not on file   Food Insecurity: Not on file   Transportation Needs: Not on file   Physical Activity: Not on file   Stress: Not on file   Social Connections: Not on file   Interpersonal Safety: Not on file   Housing Stability: Not on file      History reviewed. No pertinent family history.          Vitals:    mod edis +accel no decel  With contractions every  2-5 min    Alert Awake in NAD  HEENT grossly normal  Neck: no lymphadenopathy or thryoidomegaly  Lungs CTAB  Back no spinal or CVAT  Heart RRR  ABD gravid, nontender on exam with vertex palpable  Pelvic:  no fluid noted, no blood noted  Cervix is 2 cm / 50 % effaced at -2 station  EXT:  no edema or calf tenderness  Neuro:  Grossly intact    Assessment:  IUP at 38w3d , IOL for GHTN    Plan:   Repeat labs on admission WNL  Pt received cervidil overnight, now on IV pitocin and cook catheter at 12:15pm.  Pain management options discussed with patient.   Category 1 FHR, continue fetal monitoring        Jennifer L. Schwab, MD  Dept of OB/GYN  December 11, 2023

## 2023-12-11 NOTE — PLAN OF CARE
1515 - Received report from KIMBERLY Shaw RN. Will assume care of patient at this time. Plan of care discussed with patient and spouse and questions answered at this time.     1535- Pt called RN into room, feeling leaking. SROM at this time, clear/bloody moderate amount of fluid.     1545 - Dr. Schwab on phone and updated on status. Orders received to remove balloon catheter and checked cervix. No cervical change at this time, MD updated. Dr. Avery will be taking over at 1730.     1730 - Patient continues to do well breathing through contractions. Using nitrous to help with labor pains/discomforts.     2130 - Dr. Avery on phone and updated on patient status.     2245 - SVE 3/60/-2. Dr. Avery updated. Patient starting to feel more uncomfortable and requesting to stop nitrous and use fentanyl. Fentanyl given at this time.     2300 - Report given to ARRON Davalos who will assume care of patient at this time.

## 2023-12-11 NOTE — PLAN OF CARE
Goal Outcome Evaluation:  Dr. Schwab returned call.  Plan is to restart pitocin at 2 mu at 1500

## 2023-12-11 NOTE — PLAN OF CARE
Goal Outcome Evaluation:  Oxytocin turned off uterine tachysystole noted Dr. Schwab updated via text page.  Awaiting return call                       no

## 2023-12-12 ENCOUNTER — ANESTHESIA (OUTPATIENT)
Dept: OBGYN | Facility: CLINIC | Age: 28
End: 2023-12-12
Payer: COMMERCIAL

## 2023-12-12 ENCOUNTER — ANESTHESIA EVENT (OUTPATIENT)
Dept: OBGYN | Facility: CLINIC | Age: 28
End: 2023-12-12
Payer: COMMERCIAL

## 2023-12-12 PROBLEM — O13.3 GESTATIONAL HYPERTENSION, THIRD TRIMESTER: Status: ACTIVE | Noted: 2023-12-12

## 2023-12-12 PROBLEM — Z98.891 STATUS POST PRIMARY LOW TRANSVERSE CESAREAN SECTION: Status: ACTIVE | Noted: 2023-12-12

## 2023-12-12 PROBLEM — Z3A.38 38 WEEKS GESTATION OF PREGNANCY: Status: ACTIVE | Noted: 2023-12-12

## 2023-12-12 LAB
ALBUMIN MFR UR ELPH: 20 MG/DL
ALT SERPL W P-5'-P-CCNC: 6 U/L (ref 0–50)
AST SERPL W P-5'-P-CCNC: 21 U/L (ref 0–45)
BUN SERPL-MCNC: 15.2 MG/DL (ref 6–20)
CREAT SERPL-MCNC: 0.8 MG/DL (ref 0.51–0.95)
CREAT UR-MCNC: 85.4 MG/DL
EGFRCR SERPLBLD CKD-EPI 2021: >90 ML/MIN/1.73M2
HGB BLD-MCNC: 9.6 G/DL (ref 11.7–15.7)
PLATELET # BLD AUTO: 168 10E3/UL (ref 150–450)
PROT/CREAT 24H UR: 0.23 MG/MG CR (ref 0–0.2)

## 2023-12-12 PROCEDURE — 250N000013 HC RX MED GY IP 250 OP 250 PS 637: Performed by: OBSTETRICS & GYNECOLOGY

## 2023-12-12 PROCEDURE — 250N000011 HC RX IP 250 OP 636: Mod: JZ | Performed by: OBSTETRICS & GYNECOLOGY

## 2023-12-12 PROCEDURE — 272N000001 HC OR GENERAL SUPPLY STERILE: Performed by: OBSTETRICS & GYNECOLOGY

## 2023-12-12 PROCEDURE — 84460 ALANINE AMINO (ALT) (SGPT): CPT | Performed by: OBSTETRICS & GYNECOLOGY

## 2023-12-12 PROCEDURE — 3E0R3BZ INTRODUCTION OF ANESTHETIC AGENT INTO SPINAL CANAL, PERCUTANEOUS APPROACH: ICD-10-PCS | Performed by: ANESTHESIOLOGY

## 2023-12-12 PROCEDURE — 85018 HEMOGLOBIN: CPT | Performed by: OBSTETRICS & GYNECOLOGY

## 2023-12-12 PROCEDURE — 250N000009 HC RX 250: Performed by: STUDENT IN AN ORGANIZED HEALTH CARE EDUCATION/TRAINING PROGRAM

## 2023-12-12 PROCEDURE — 84450 TRANSFERASE (AST) (SGOT): CPT | Performed by: OBSTETRICS & GYNECOLOGY

## 2023-12-12 PROCEDURE — 250N000011 HC RX IP 250 OP 636: Mod: JZ

## 2023-12-12 PROCEDURE — 710N000010 HC RECOVERY PHASE 1, LEVEL 2, PER MIN: Performed by: OBSTETRICS & GYNECOLOGY

## 2023-12-12 PROCEDURE — 82565 ASSAY OF CREATININE: CPT | Performed by: OBSTETRICS & GYNECOLOGY

## 2023-12-12 PROCEDURE — 250N000011 HC RX IP 250 OP 636: Performed by: ANESTHESIOLOGY

## 2023-12-12 PROCEDURE — 84156 ASSAY OF PROTEIN URINE: CPT | Performed by: OBSTETRICS & GYNECOLOGY

## 2023-12-12 PROCEDURE — 120N000012 HC R&B POSTPARTUM

## 2023-12-12 PROCEDURE — 84520 ASSAY OF UREA NITROGEN: CPT | Performed by: OBSTETRICS & GYNECOLOGY

## 2023-12-12 PROCEDURE — 250N000011 HC RX IP 250 OP 636: Performed by: OBSTETRICS & GYNECOLOGY

## 2023-12-12 PROCEDURE — 250N000011 HC RX IP 250 OP 636: Performed by: NURSE ANESTHETIST, CERTIFIED REGISTERED

## 2023-12-12 PROCEDURE — 36415 COLL VENOUS BLD VENIPUNCTURE: CPT | Performed by: OBSTETRICS & GYNECOLOGY

## 2023-12-12 PROCEDURE — 360N000076 HC SURGERY LEVEL 3, PER MIN: Performed by: OBSTETRICS & GYNECOLOGY

## 2023-12-12 PROCEDURE — 250N000011 HC RX IP 250 OP 636: Mod: JZ | Performed by: ANESTHESIOLOGY

## 2023-12-12 PROCEDURE — 370N000017 HC ANESTHESIA TECHNICAL FEE, PER MIN: Performed by: OBSTETRICS & GYNECOLOGY

## 2023-12-12 PROCEDURE — 250N000009 HC RX 250: Performed by: NURSE ANESTHETIST, CERTIFIED REGISTERED

## 2023-12-12 PROCEDURE — 85049 AUTOMATED PLATELET COUNT: CPT | Performed by: OBSTETRICS & GYNECOLOGY

## 2023-12-12 PROCEDURE — 258N000003 HC RX IP 258 OP 636: Performed by: OBSTETRICS & GYNECOLOGY

## 2023-12-12 PROCEDURE — 00HU33Z INSERTION OF INFUSION DEVICE INTO SPINAL CANAL, PERCUTANEOUS APPROACH: ICD-10-PCS | Performed by: ANESTHESIOLOGY

## 2023-12-12 PROCEDURE — 258N000003 HC RX IP 258 OP 636: Performed by: NURSE ANESTHETIST, CERTIFIED REGISTERED

## 2023-12-12 RX ORDER — ACETAMINOPHEN 325 MG/1
975 TABLET ORAL EVERY 6 HOURS
Status: DISCONTINUED | OUTPATIENT
Start: 2023-12-12 | End: 2023-12-15 | Stop reason: HOSPADM

## 2023-12-12 RX ORDER — NIFEDIPINE 30 MG/1
30 TABLET, EXTENDED RELEASE ORAL ONCE
Status: COMPLETED | OUTPATIENT
Start: 2023-12-12 | End: 2023-12-12

## 2023-12-12 RX ORDER — LIDOCAINE 40 MG/G
CREAM TOPICAL
Status: DISCONTINUED | OUTPATIENT
Start: 2023-12-12 | End: 2023-12-12 | Stop reason: HOSPADM

## 2023-12-12 RX ORDER — OXYTOCIN/0.9 % SODIUM CHLORIDE 30/500 ML
340 PLASTIC BAG, INJECTION (ML) INTRAVENOUS CONTINUOUS PRN
Status: DISCONTINUED | OUTPATIENT
Start: 2023-12-12 | End: 2023-12-15 | Stop reason: HOSPADM

## 2023-12-12 RX ORDER — OXYCODONE HYDROCHLORIDE 5 MG/1
5 TABLET ORAL EVERY 4 HOURS PRN
Status: DISCONTINUED | OUTPATIENT
Start: 2023-12-12 | End: 2023-12-15 | Stop reason: HOSPADM

## 2023-12-12 RX ORDER — HYDROCORTISONE 25 MG/G
CREAM TOPICAL 3 TIMES DAILY PRN
Status: DISCONTINUED | OUTPATIENT
Start: 2023-12-12 | End: 2023-12-15 | Stop reason: HOSPADM

## 2023-12-12 RX ORDER — OXYTOCIN/0.9 % SODIUM CHLORIDE 30/500 ML
340 PLASTIC BAG, INJECTION (ML) INTRAVENOUS CONTINUOUS PRN
Status: DISCONTINUED | OUTPATIENT
Start: 2023-12-12 | End: 2023-12-12 | Stop reason: HOSPADM

## 2023-12-12 RX ORDER — NALOXONE HYDROCHLORIDE 0.4 MG/ML
0.4 INJECTION, SOLUTION INTRAMUSCULAR; INTRAVENOUS; SUBCUTANEOUS
Status: DISCONTINUED | OUTPATIENT
Start: 2023-12-12 | End: 2023-12-15 | Stop reason: HOSPADM

## 2023-12-12 RX ORDER — ROPIVACAINE HYDROCHLORIDE 2 MG/ML
10 INJECTION, SOLUTION EPIDURAL; INFILTRATION; PERINEURAL ONCE
Status: DISCONTINUED | OUTPATIENT
Start: 2023-12-12 | End: 2023-12-12 | Stop reason: HOSPADM

## 2023-12-12 RX ORDER — ROPIVACAINE HYDROCHLORIDE 2 MG/ML
INJECTION, SOLUTION EPIDURAL; INFILTRATION; PERINEURAL
Status: COMPLETED | OUTPATIENT
Start: 2023-12-12 | End: 2023-12-12

## 2023-12-12 RX ORDER — ACETAMINOPHEN 325 MG/1
975 TABLET ORAL ONCE
Status: COMPLETED | OUTPATIENT
Start: 2023-12-12 | End: 2023-12-12

## 2023-12-12 RX ORDER — CEFAZOLIN SODIUM 2 G/100ML
2 INJECTION, SOLUTION INTRAVENOUS
Status: COMPLETED | OUTPATIENT
Start: 2023-12-12 | End: 2023-12-12

## 2023-12-12 RX ORDER — EPHEDRINE SULFATE 50 MG/ML
5 INJECTION, SOLUTION INTRAMUSCULAR; INTRAVENOUS; SUBCUTANEOUS
Status: DISCONTINUED | OUTPATIENT
Start: 2023-12-12 | End: 2023-12-12 | Stop reason: HOSPADM

## 2023-12-12 RX ORDER — BISACODYL 10 MG
10 SUPPOSITORY, RECTAL RECTAL DAILY PRN
Status: DISCONTINUED | OUTPATIENT
Start: 2023-12-14 | End: 2023-12-15 | Stop reason: HOSPADM

## 2023-12-12 RX ORDER — MODIFIED LANOLIN
OINTMENT (GRAM) TOPICAL
Status: DISCONTINUED | OUTPATIENT
Start: 2023-12-12 | End: 2023-12-15 | Stop reason: HOSPADM

## 2023-12-12 RX ORDER — TRANEXAMIC ACID 10 MG/ML
1 INJECTION, SOLUTION INTRAVENOUS EVERY 30 MIN PRN
Status: DISCONTINUED | OUTPATIENT
Start: 2023-12-12 | End: 2023-12-15 | Stop reason: HOSPADM

## 2023-12-12 RX ORDER — PROCHLORPERAZINE 25 MG
25 SUPPOSITORY, RECTAL RECTAL EVERY 12 HOURS PRN
Status: DISCONTINUED | OUTPATIENT
Start: 2023-12-12 | End: 2023-12-15 | Stop reason: HOSPADM

## 2023-12-12 RX ORDER — NALBUPHINE HYDROCHLORIDE 20 MG/ML
2.5-5 INJECTION, SOLUTION INTRAMUSCULAR; INTRAVENOUS; SUBCUTANEOUS EVERY 6 HOURS PRN
Status: DISCONTINUED | OUTPATIENT
Start: 2023-12-12 | End: 2023-12-15 | Stop reason: HOSPADM

## 2023-12-12 RX ORDER — KETAMINE HYDROCHLORIDE 10 MG/ML
INJECTION INTRAMUSCULAR; INTRAVENOUS PRN
Status: DISCONTINUED | OUTPATIENT
Start: 2023-12-12 | End: 2023-12-12

## 2023-12-12 RX ORDER — ONDANSETRON 4 MG/1
4 TABLET, ORALLY DISINTEGRATING ORAL EVERY 6 HOURS PRN
Status: DISCONTINUED | OUTPATIENT
Start: 2023-12-12 | End: 2023-12-15 | Stop reason: HOSPADM

## 2023-12-12 RX ORDER — AMOXICILLIN 250 MG
1 CAPSULE ORAL 2 TIMES DAILY
Status: DISCONTINUED | OUTPATIENT
Start: 2023-12-12 | End: 2023-12-15 | Stop reason: HOSPADM

## 2023-12-12 RX ORDER — MISOPROSTOL 200 UG/1
800 TABLET ORAL
Status: DISCONTINUED | OUTPATIENT
Start: 2023-12-12 | End: 2023-12-15 | Stop reason: HOSPADM

## 2023-12-12 RX ORDER — DEXTROSE, SODIUM CHLORIDE, SODIUM LACTATE, POTASSIUM CHLORIDE, AND CALCIUM CHLORIDE 5; .6; .31; .03; .02 G/100ML; G/100ML; G/100ML; G/100ML; G/100ML
INJECTION, SOLUTION INTRAVENOUS CONTINUOUS
Status: DISCONTINUED | OUTPATIENT
Start: 2023-12-12 | End: 2023-12-15 | Stop reason: HOSPADM

## 2023-12-12 RX ORDER — OXYTOCIN/0.9 % SODIUM CHLORIDE 30/500 ML
100-340 PLASTIC BAG, INJECTION (ML) INTRAVENOUS CONTINUOUS PRN
Status: CANCELLED | OUTPATIENT
Start: 2023-12-12

## 2023-12-12 RX ORDER — TRANEXAMIC ACID 10 MG/ML
1 INJECTION, SOLUTION INTRAVENOUS EVERY 30 MIN PRN
Status: DISCONTINUED | OUTPATIENT
Start: 2023-12-12 | End: 2023-12-12 | Stop reason: HOSPADM

## 2023-12-12 RX ORDER — CEFAZOLIN SODIUM/WATER 2 G/20 ML
SYRINGE (ML) INTRAVENOUS
Status: DISCONTINUED
Start: 2023-12-12 | End: 2023-12-12 | Stop reason: HOSPADM

## 2023-12-12 RX ORDER — MISOPROSTOL 200 UG/1
400 TABLET ORAL
Status: DISCONTINUED | OUTPATIENT
Start: 2023-12-12 | End: 2023-12-15 | Stop reason: HOSPADM

## 2023-12-12 RX ORDER — CARBOPROST TROMETHAMINE 250 UG/ML
250 INJECTION, SOLUTION INTRAMUSCULAR
Status: DISCONTINUED | OUTPATIENT
Start: 2023-12-12 | End: 2023-12-15 | Stop reason: HOSPADM

## 2023-12-12 RX ORDER — EPHEDRINE SULFATE 50 MG/ML
5 INJECTION, SOLUTION INTRAMUSCULAR; INTRAVENOUS; SUBCUTANEOUS
Status: DISCONTINUED | OUTPATIENT
Start: 2023-12-12 | End: 2023-12-12

## 2023-12-12 RX ORDER — IBUPROFEN 400 MG/1
800 TABLET, FILM COATED ORAL EVERY 6 HOURS
Status: DISCONTINUED | OUTPATIENT
Start: 2023-12-13 | End: 2023-12-15 | Stop reason: HOSPADM

## 2023-12-12 RX ORDER — MORPHINE SULFATE 1 MG/ML
INJECTION, SOLUTION EPIDURAL; INTRATHECAL; INTRAVENOUS PRN
Status: DISCONTINUED | OUTPATIENT
Start: 2023-12-12 | End: 2023-12-12

## 2023-12-12 RX ORDER — NALBUPHINE HYDROCHLORIDE 20 MG/ML
2.5-5 INJECTION, SOLUTION INTRAMUSCULAR; INTRAVENOUS; SUBCUTANEOUS EVERY 6 HOURS PRN
Status: DISCONTINUED | OUTPATIENT
Start: 2023-12-12 | End: 2023-12-12

## 2023-12-12 RX ORDER — OXYTOCIN/0.9 % SODIUM CHLORIDE 30/500 ML
PLASTIC BAG, INJECTION (ML) INTRAVENOUS CONTINUOUS PRN
Status: DISCONTINUED | OUTPATIENT
Start: 2023-12-12 | End: 2023-12-12

## 2023-12-12 RX ORDER — ONDANSETRON 2 MG/ML
INJECTION INTRAMUSCULAR; INTRAVENOUS PRN
Status: DISCONTINUED | OUTPATIENT
Start: 2023-12-12 | End: 2023-12-12

## 2023-12-12 RX ORDER — CEFAZOLIN SODIUM 2 G/100ML
2 INJECTION, SOLUTION INTRAVENOUS SEE ADMIN INSTRUCTIONS
Status: DISCONTINUED | OUTPATIENT
Start: 2023-12-12 | End: 2023-12-12 | Stop reason: HOSPADM

## 2023-12-12 RX ORDER — CITRIC ACID/SODIUM CITRATE 334-500MG
30 SOLUTION, ORAL ORAL
Status: COMPLETED | OUTPATIENT
Start: 2023-12-12 | End: 2023-12-12

## 2023-12-12 RX ORDER — ONDANSETRON 2 MG/ML
4 INJECTION INTRAMUSCULAR; INTRAVENOUS EVERY 6 HOURS PRN
Status: DISCONTINUED | OUTPATIENT
Start: 2023-12-12 | End: 2023-12-15 | Stop reason: HOSPADM

## 2023-12-12 RX ORDER — METHYLERGONOVINE MALEATE 0.2 MG/ML
200 INJECTION INTRAVENOUS
Status: DISCONTINUED | OUTPATIENT
Start: 2023-12-12 | End: 2023-12-15 | Stop reason: HOSPADM

## 2023-12-12 RX ORDER — AMOXICILLIN 250 MG
2 CAPSULE ORAL 2 TIMES DAILY
Status: DISCONTINUED | OUTPATIENT
Start: 2023-12-12 | End: 2023-12-15 | Stop reason: HOSPADM

## 2023-12-12 RX ORDER — METOCLOPRAMIDE HYDROCHLORIDE 5 MG/ML
10 INJECTION INTRAMUSCULAR; INTRAVENOUS EVERY 6 HOURS PRN
Status: DISCONTINUED | OUTPATIENT
Start: 2023-12-12 | End: 2023-12-15 | Stop reason: HOSPADM

## 2023-12-12 RX ORDER — METHYLERGONOVINE MALEATE 0.2 MG/ML
200 INJECTION INTRAVENOUS
Status: DISCONTINUED | OUTPATIENT
Start: 2023-12-12 | End: 2023-12-12 | Stop reason: HOSPADM

## 2023-12-12 RX ORDER — SODIUM CHLORIDE, SODIUM LACTATE, POTASSIUM CHLORIDE, CALCIUM CHLORIDE 600; 310; 30; 20 MG/100ML; MG/100ML; MG/100ML; MG/100ML
INJECTION, SOLUTION INTRAVENOUS CONTINUOUS
Status: DISCONTINUED | OUTPATIENT
Start: 2023-12-12 | End: 2023-12-12 | Stop reason: HOSPADM

## 2023-12-12 RX ORDER — PROCHLORPERAZINE MALEATE 10 MG
10 TABLET ORAL EVERY 6 HOURS PRN
Status: DISCONTINUED | OUTPATIENT
Start: 2023-12-12 | End: 2023-12-15 | Stop reason: HOSPADM

## 2023-12-12 RX ORDER — OXYTOCIN 10 [USP'U]/ML
10 INJECTION, SOLUTION INTRAMUSCULAR; INTRAVENOUS
Status: CANCELLED | OUTPATIENT
Start: 2023-12-12

## 2023-12-12 RX ORDER — MISOPROSTOL 200 UG/1
800 TABLET ORAL
Status: DISCONTINUED | OUTPATIENT
Start: 2023-12-12 | End: 2023-12-12 | Stop reason: HOSPADM

## 2023-12-12 RX ORDER — AZITHROMYCIN 500 MG/1
500 INJECTION, POWDER, LYOPHILIZED, FOR SOLUTION INTRAVENOUS
Status: COMPLETED | OUTPATIENT
Start: 2023-12-12 | End: 2023-12-12

## 2023-12-12 RX ORDER — HYDROMORPHONE HCL IN WATER/PF 6 MG/30 ML
.3-.5 PATIENT CONTROLLED ANALGESIA SYRINGE INTRAVENOUS
Status: DISCONTINUED | OUTPATIENT
Start: 2023-12-12 | End: 2023-12-15 | Stop reason: HOSPADM

## 2023-12-12 RX ORDER — FENTANYL CITRATE-0.9 % NACL/PF 10 MCG/ML
100 PLASTIC BAG, INJECTION (ML) INTRAVENOUS EVERY 5 MIN PRN
Status: DISCONTINUED | OUTPATIENT
Start: 2023-12-12 | End: 2023-12-12 | Stop reason: HOSPADM

## 2023-12-12 RX ORDER — METOCLOPRAMIDE HYDROCHLORIDE 5 MG/ML
INJECTION INTRAMUSCULAR; INTRAVENOUS
Status: COMPLETED
Start: 2023-12-12 | End: 2023-12-12

## 2023-12-12 RX ORDER — ROPIVACAINE HYDROCHLORIDE 2 MG/ML
10 INJECTION, SOLUTION EPIDURAL; INFILTRATION; PERINEURAL ONCE
Status: COMPLETED | OUTPATIENT
Start: 2023-12-12 | End: 2023-12-12

## 2023-12-12 RX ORDER — OXYTOCIN 10 [USP'U]/ML
10 INJECTION, SOLUTION INTRAMUSCULAR; INTRAVENOUS
Status: DISCONTINUED | OUTPATIENT
Start: 2023-12-12 | End: 2023-12-15 | Stop reason: HOSPADM

## 2023-12-12 RX ORDER — NALOXONE HYDROCHLORIDE 0.4 MG/ML
0.2 INJECTION, SOLUTION INTRAMUSCULAR; INTRAVENOUS; SUBCUTANEOUS
Status: DISCONTINUED | OUTPATIENT
Start: 2023-12-12 | End: 2023-12-15 | Stop reason: HOSPADM

## 2023-12-12 RX ORDER — OXYTOCIN 10 [USP'U]/ML
10 INJECTION, SOLUTION INTRAMUSCULAR; INTRAVENOUS
Status: DISCONTINUED | OUTPATIENT
Start: 2023-12-12 | End: 2023-12-12 | Stop reason: HOSPADM

## 2023-12-12 RX ORDER — LIDOCAINE 40 MG/G
CREAM TOPICAL
Status: DISCONTINUED | OUTPATIENT
Start: 2023-12-12 | End: 2023-12-15 | Stop reason: HOSPADM

## 2023-12-12 RX ORDER — LIDOCAINE HCL/EPINEPHRINE/PF 2%-1:200K
VIAL (ML) INJECTION PRN
Status: DISCONTINUED | OUTPATIENT
Start: 2023-12-12 | End: 2023-12-12

## 2023-12-12 RX ORDER — KETOROLAC TROMETHAMINE 30 MG/ML
30 INJECTION, SOLUTION INTRAMUSCULAR; INTRAVENOUS EVERY 6 HOURS
Status: COMPLETED | OUTPATIENT
Start: 2023-12-12 | End: 2023-12-13

## 2023-12-12 RX ORDER — CARBOPROST TROMETHAMINE 250 UG/ML
250 INJECTION, SOLUTION INTRAMUSCULAR
Status: DISCONTINUED | OUTPATIENT
Start: 2023-12-12 | End: 2023-12-12 | Stop reason: HOSPADM

## 2023-12-12 RX ORDER — MISOPROSTOL 200 UG/1
400 TABLET ORAL
Status: DISCONTINUED | OUTPATIENT
Start: 2023-12-12 | End: 2023-12-12 | Stop reason: HOSPADM

## 2023-12-12 RX ORDER — NALBUPHINE HYDROCHLORIDE 20 MG/ML
2.5-5 INJECTION, SOLUTION INTRAMUSCULAR; INTRAVENOUS; SUBCUTANEOUS EVERY 6 HOURS PRN
Status: DISCONTINUED | OUTPATIENT
Start: 2023-12-12 | End: 2023-12-13

## 2023-12-12 RX ORDER — AZITHROMYCIN 500 MG/1
INJECTION, POWDER, LYOPHILIZED, FOR SOLUTION INTRAVENOUS
Status: DISCONTINUED
Start: 2023-12-12 | End: 2023-12-12 | Stop reason: HOSPADM

## 2023-12-12 RX ORDER — SIMETHICONE 80 MG
80 TABLET,CHEWABLE ORAL 4 TIMES DAILY PRN
Status: DISCONTINUED | OUTPATIENT
Start: 2023-12-12 | End: 2023-12-15 | Stop reason: HOSPADM

## 2023-12-12 RX ORDER — METOCLOPRAMIDE 10 MG/1
10 TABLET ORAL EVERY 6 HOURS PRN
Status: DISCONTINUED | OUTPATIENT
Start: 2023-12-12 | End: 2023-12-15 | Stop reason: HOSPADM

## 2023-12-12 RX ADMIN — LIDOCAINE HYDROCHLORIDE,EPINEPHRINE BITARTRATE 4 ML: 20; .005 INJECTION, SOLUTION EPIDURAL; INFILTRATION; INTRACAUDAL; PERINEURAL at 17:04

## 2023-12-12 RX ADMIN — SODIUM CHLORIDE, POTASSIUM CHLORIDE, SODIUM LACTATE AND CALCIUM CHLORIDE: 600; 310; 30; 20 INJECTION, SOLUTION INTRAVENOUS at 15:46

## 2023-12-12 RX ADMIN — SODIUM CITRATE AND CITRIC ACID MONOHYDRATE 30 ML: 500; 334 SOLUTION ORAL at 16:41

## 2023-12-12 RX ADMIN — LIDOCAINE HYDROCHLORIDE,EPINEPHRINE BITARTRATE 12 ML: 20; .005 INJECTION, SOLUTION EPIDURAL; INFILTRATION; INTRACAUDAL; PERINEURAL at 16:59

## 2023-12-12 RX ADMIN — SODIUM CHLORIDE, SODIUM LACTATE, POTASSIUM CHLORIDE, CALCIUM CHLORIDE AND DEXTROSE MONOHYDRATE: 5; 600; 310; 30; 20 INJECTION, SOLUTION INTRAVENOUS at 23:03

## 2023-12-12 RX ADMIN — METOCLOPRAMIDE 10 MG: 5 INJECTION, SOLUTION INTRAMUSCULAR; INTRAVENOUS at 18:11

## 2023-12-12 RX ADMIN — SODIUM CHLORIDE, POTASSIUM CHLORIDE, SODIUM LACTATE AND CALCIUM CHLORIDE: 600; 310; 30; 20 INJECTION, SOLUTION INTRAVENOUS at 00:21

## 2023-12-12 RX ADMIN — Medication: at 08:40

## 2023-12-12 RX ADMIN — PHENYLEPHRINE HYDROCHLORIDE 100 MCG: 10 INJECTION INTRAVENOUS at 17:25

## 2023-12-12 RX ADMIN — AZITHROMYCIN MONOHYDRATE 500 MG: 500 INJECTION, POWDER, LYOPHILIZED, FOR SOLUTION INTRAVENOUS at 17:06

## 2023-12-12 RX ADMIN — MORPHINE SULFATE 3 MG: 1 INJECTION, SOLUTION EPIDURAL; INTRATHECAL; INTRAVENOUS at 17:28

## 2023-12-12 RX ADMIN — TRANEXAMIC ACID 1 G: 1 INJECTION, SOLUTION INTRAVENOUS at 17:26

## 2023-12-12 RX ADMIN — Medication: at 00:43

## 2023-12-12 RX ADMIN — Medication: at 15:16

## 2023-12-12 RX ADMIN — SODIUM CHLORIDE, POTASSIUM CHLORIDE, SODIUM LACTATE AND CALCIUM CHLORIDE: 600; 310; 30; 20 INJECTION, SOLUTION INTRAVENOUS at 08:07

## 2023-12-12 RX ADMIN — NIFEDIPINE 30 MG: 30 TABLET, FILM COATED, EXTENDED RELEASE ORAL at 23:03

## 2023-12-12 RX ADMIN — CEFAZOLIN SODIUM 2 G: 2 INJECTION, SOLUTION INTRAVENOUS at 17:01

## 2023-12-12 RX ADMIN — PHENYLEPHRINE HYDROCHLORIDE 0.4 MCG/KG/MIN: 10 INJECTION INTRAVENOUS at 17:06

## 2023-12-12 RX ADMIN — ROPIVACAINE HYDROCHLORIDE 5 ML: 2 INJECTION, SOLUTION EPIDURAL; INFILTRATION at 00:37

## 2023-12-12 RX ADMIN — Medication 340 ML/HR: at 17:27

## 2023-12-12 RX ADMIN — DOCUSATE SODIUM 50 MG AND SENNOSIDES 8.6 MG 1 TABLET: 8.6; 5 TABLET, FILM COATED ORAL at 21:20

## 2023-12-12 RX ADMIN — Medication 10 MG: at 17:15

## 2023-12-12 RX ADMIN — PHENYLEPHRINE HYDROCHLORIDE 100 MCG: 10 INJECTION INTRAVENOUS at 17:34

## 2023-12-12 RX ADMIN — ONDANSETRON 4 MG: 2 INJECTION INTRAMUSCULAR; INTRAVENOUS at 17:04

## 2023-12-12 RX ADMIN — Medication: at 00:26

## 2023-12-12 RX ADMIN — ACETAMINOPHEN 975 MG: 325 TABLET, FILM COATED ORAL at 23:03

## 2023-12-12 RX ADMIN — LIDOCAINE HYDROCHLORIDE,EPINEPHRINE BITARTRATE 4 ML: 20; .005 INJECTION, SOLUTION EPIDURAL; INFILTRATION; INTRACAUDAL; PERINEURAL at 17:12

## 2023-12-12 RX ADMIN — ACETAMINOPHEN 975 MG: 325 TABLET, FILM COATED ORAL at 16:41

## 2023-12-12 RX ADMIN — ROPIVACAINE HYDROCHLORIDE 10 ML: 2 INJECTION, SOLUTION EPIDURAL; INFILTRATION at 00:14

## 2023-12-12 RX ADMIN — METOCLOPRAMIDE HYDROCHLORIDE 10 MG: 5 INJECTION INTRAMUSCULAR; INTRAVENOUS at 18:11

## 2023-12-12 RX ADMIN — KETOROLAC TROMETHAMINE 30 MG: 30 INJECTION, SOLUTION INTRAMUSCULAR; INTRAVENOUS at 21:20

## 2023-12-12 RX ADMIN — PHENYLEPHRINE HYDROCHLORIDE 100 MCG: 10 INJECTION INTRAVENOUS at 17:15

## 2023-12-12 ASSESSMENT — ACTIVITIES OF DAILY LIVING (ADL)
ADLS_ACUITY_SCORE: 18

## 2023-12-12 NOTE — PROGRESS NOTES
Pushing with good effort x 2+ hours  Fetal station remains at +1 with caput   moderate variability, no accelerations + decelerations with pushing  Discussed with patient that I don't feel confident we would be successful with addition of vacuum due to narrow pelvis, and with her effort I'd expect improvement of station after pushing for this long. Consent obtained for primary  section for arrest of descent.  Jennifer L. Schwab, MD

## 2023-12-12 NOTE — ANESTHESIA CARE TRANSFER NOTE
Patient: Lulu Trujillo    Procedure: Procedure(s):   section       Diagnosis: Arrest of descent, delivered, current hospitalization [O62.1]  Diagnosis Additional Information: No value filed.    Anesthesia Type:   Epidural     Note:    Oropharynx: oropharynx clear of all foreign objects  Level of Consciousness: awake  Oxygen Supplementation: room air    Independent Airway: airway patency satisfactory and stable  Dentition: dentition unchanged  Vital Signs Stable: post-procedure vital signs reviewed and stable    Patient transferred to: Labor and Delivery    Handoff Report: Identifed the Patient, Identified the Reponsible Provider, Reviewed the pertinent medical history, Discussed the surgical course, Reviewed Intra-OP anesthesia mangement and issues during anesthesia, Set expectations for post-procedure period and Allowed opportunity for questions and acknowledgement of understanding        Electronically Signed By: ELISA Wallace CRNA  2023  5:58 PM

## 2023-12-12 NOTE — PROGRESS NOTES
S: Resting comfortably with epidural    O:  /82 (BP Location: Left arm)   Pulse 92   Temp 97.9  F (36.6  C) (Temporal)   Resp 16   LMP 03/17/2023   SpO2 100%      moderate variability +accelerations no decelerations    [place] contractions every 2-4 minutes    SVE 5/100/-1 IUPC placed    Pitocin at 16     A/P: 28 year old G1 at 38w4d, IOL for GHTN  Normotensive    S/p cervidil, cook catheter (removed after ~3 hours due to SROM), on pitocin    Resting comfortably with epidural    Appears to be transitioning into active labor    IUPC placed to assess for adequacy of contractions    Category 1 reactive FHR    Continue to monitor    Jennifer L. Schwab, MD

## 2023-12-12 NOTE — L&D DELIVERY NOTE
OB  Delivery and Operative Note      Lulu Trujillo MRN# [id number]   Age: 28 year old YOB: 1995       GA: 38w4d  GP:   Labor Complications: Failure to Progress in Second Stage   EBL:   mL  Delivery QBL:    Delivery Type: , Low Transverse   ROM to Delivery Time: (Delivered) Days: 1 Hours: 1 Minutes: 52     1 Minute 5 Minute 10 Minute   Apgar Totals: 8   9        Personel Present:       Details    Pre-Op Diagnosis: 1. Intrauterine pregnancy at 38w4d  2. Gestational hypertension  3. Arrest of descent   Post-Op Diagnosis: 1. Same as above   Indications:  Arrest of descent    Procedure:   , Low Transverse  via   incision   Anesthesia: Nitrous Oxide;INTRAVENOUS ;Epidural        Surgeon: Jennifer Schwab MD  Assistant: Jose D Tuttle MD    Informed Consent:  The risks, benefits, complications, and alternatives were discussed with the patient. The patient understood that the risks of  section include, but are not limited to: injury to nearby structures or organs, infection, blood loss and possible need for transfusion, and potential need for more surgery including hysterectomy. The patient stated understanding and desired to proceed. All questions were answered. The site of surgery was properly noted and marked. The patient was identified as Lulu Trujillo and the procedure verified as a  delivery. A Time Out was held and the above information confirmed.    Procedure Details:  Fetal pillow placed and infused with 180cc saline to elevate fetal vertex.  The patient was taken to the operating room where epidural anesthesia was found to be adequate. Antibiotics were given for infection prophylaxis. She was prepped and draped in the normal sterile fashion in the dorsal supine position with leftward tilt. A Pfannenstiel skin incision was made with scalpel. The incision was carried down to the fascia with bovie cauterization. The fascia was  incised and extended laterally with Whitney scissors. The inferior aspect of the fascia was grasped with Kocher clamps. The underlying rectus and pyramidalis muscles were dissected off with Whitney scissors. In a similar fashion, the superior aspect of the fascia was elevated with Kocher clamps, and the rectus muscle was dissected off. The rectus muscles were  bluntly down the midline down to the level of the pubic symphysis. The peritoneum was identified and entered bluntly. Peritoneal incision was extended superiorly and inferiorly with good visualization of the bladder.    The Florentin O retractor was inserted, vesicouterine peritoneum was identified, and bladder flap created sharply. The lower uterine segment was then incised with a   incision and was extended bluntly. The amniotic sac was ruptured and Clear;Bloody  color noted. The bladder blade was removed and the infant was delivered atraumatically using the usual maneuvers. The remainder of the infant was delivered, the cord clamped and cut, and the baby was handed off to the awaiting clinicians.     The placenta was removed via manual massage. The uterus was then cleared of all clots and debris. The hysterotomy was repaired with suture of 0 Vicryl in a running locked fashion. A figure of eight of the same suture was placed on the left angle and good hemostasis observed. The ovaries and tubes appeared normal bilaterally. The uterus, tubes, and ovaries were then returned to the abdomen and pelvis. The uterine incision was reinspected and found to be hemostatic. The subfascial spaces were inspected and noted to be hemostatic. Florentin O retractor removed. The fascia was then reapproximated with a running suture of 0 Vicryl. The skin was closed with 4-0 Vicryl sutures.    The patient tolerated the procedure well. Sponge, instrument, and needle counts were correct and the patient was taken to the recovery room in good and stable condition.       Barbara Trujillo  "Tc [[id number]]      Labor Event Times      Latent labor onset date/time: 2023 2300    Active labor onset date: 23 Onset time: 10:00 AM   Dilation complete date: 23 Complete time:  2:08 PM   Start pushing date/time: 2023 1411          Labor Events     labor?: No   steroids: None  Labor Type: Induction/Cervical ripening  Predominate monitoring during 1st stage: continuous electronic fetal monitoring     Antibiotics received during labor?: No       Rupture date/time: 23 1535   Rupture type: Spontaneous Rupture of Membranes  Fluid color: Clear, Bloody     Induction: Cervidil  Induction date/time:      Cervical ripening date/time: 12/10/23 2124    Indications for induction: Gestational Hypertension     Augmentation: Oxytocin  Indications for augmentation: Ineffective Contraction Pattern       Delivery/Placenta Date and Time      Delivery Date: 23 Delivery Time:  5:27 PM                 Apgars    Living status: Living   1 Minute 5 Minute 10 Minute 15 Minute 20 Minute   Skin color: 0  1       Heart rate: 2  2       Reflex irritability: 2  2       Muscle tone: 2  2       Respiratory effort: 2  2       Total: 8  9       Apgars assigned by: XOCHILT THOMAS RN       Cord      Vessels: 3 Vessels    Cord Complications: None               Cord Blood Disposition: Discard    Gases Sent?: No    Delayed cord clamping?: Yes    Cord Clamping Delay (seconds):  seconds            Stem cell collection?: No            Resuscitation    Methods: None       Output in Delivery Room: Voided       [place] Measurements      Weight: 7 lb 9.3 oz Length: 1' 9\"     Head circumference: 33.7 cm    Output in delivery room: Voided       Skin to Skin and Feeding Plan      Skin to skin initiation date/time: 1841    Skin to skin with: Mother  Skin to skin end date/time:            Labor Events and Shoulder Dystocia    Fetal Tracing Prior to Delivery: Category 2  Shoulder dystocia " present?: Neg                 Delivery (Maternal) (Provider to Complete) ([id number])    Episiotomy: None      Repair suture: None       Blood Loss  Mother: Lulu Lazo #[id number]     Start of Mother's Information      Delivery Blood Loss  23 1000 - 23 1752      Total Surgical QBL Blood Loss (mL) Hospital Encounter 445 mL    Total  445 mL               End of Mother's Information  Mother: Lulu Lazo #[id number]                Delivery - Provider to Complete ([id number])    Delivery Type (Choose the 1 that will go to the Birth History): , Low Transverse                          Priority: Urgent      Specifics: Primary nulliparous     Indications for : Arrest of descent                       Placenta    Removal: Expressed  Disposition: Hospital disposal             Anesthesia    Method: Nitrous Oxide, INTRAVENOUS , Epidural  Cervical dilation at placement: 0-3                    Presentation and Position    Presentation: Vertex    Position: Left Occiput Anterior                     Jennifer L. Schwab, MD

## 2023-12-12 NOTE — PROGRESS NOTES
Assumed care of patient at approx 1115. Pt resting with peanut ball, family at bedside. Dr Schwab at bedside for assessment. SVE 9/100/0, bloody show. Pitocin at 24Mu/hr. IUPC in place. MVUs not regularly calculated as pt made significant cervical change. Pt reports feeling intermittent vaginal pressure. All questions and concerns addressed. Cont to monitor and assess.

## 2023-12-12 NOTE — ANESTHESIA PREPROCEDURE EVALUATION
"Anesthesia Pre-Procedure Evaluation    Patient: Lulu Trujillo   MRN: [id number] : 1995        Procedure : * No procedures listed *          History reviewed. No pertinent past medical history.   Past Surgical History:   Procedure Laterality Date    ADENOIDECTOMY      4 years old    RHINOPLASTY      age 14      Allergies   Allergen Reactions    Bactrim [Sulfamethoxazole W-Trimethoprim]      Rash and dyspnea.      Social History     Tobacco Use    Smoking status: Never    Smokeless tobacco: Never   Substance Use Topics    Alcohol use: Not Currently      Wt Readings from Last 1 Encounters:   18 48.9 kg (107 lb 14.4 oz)        Anesthesia Evaluation            ROS/MED HX  ENT/Pulmonary:    (-) asthma   Neurologic:  - neg neurologic ROS     Cardiovascular:    (-) PIH   METS/Exercise Tolerance:     Hematologic:     (+)  no anticoagulation therapy, no coagulopathy,             Musculoskeletal:       GI/Hepatic:     (+) GERD,                   Renal/Genitourinary:       Endo:       Psychiatric/Substance Use:       Infectious Disease:       Malignancy:       Other:            Physical Exam    Airway        Mallampati: II   TM distance: > 3 FB   Neck ROM: full     Respiratory Devices and Support         Dental  no notable dental history         Cardiovascular   cardiovascular exam normal          Pulmonary   pulmonary exam normal                OUTSIDE LABS:  CBC:   Lab Results   Component Value Date    WBC 6.8 12/10/2023    HGB 11.7 12/10/2023    HCT 34.0 (L) 12/10/2023     12/10/2023     BMP: No results found for: \"NA\", \"POTASSIUM\", \"CHLORIDE\", \"CO2\", \"BUN\", \"CR\", \"GLC\"  COAGS: No results found for: \"PTT\", \"INR\", \"FIBR\"  POC: No results found for: \"BGM\", \"HCG\", \"HCGS\"  HEPATIC: No results found for: \"ALBUMIN\", \"PROTTOTAL\", \"ALT\", \"AST\", \"GGT\", \"ALKPHOS\", \"BILITOTAL\", \"BILIDIRECT\", \"CLINT\"  OTHER: No results found for: \"PH\", \"LACT\", \"A1C\", \"THERESA\", \"PHOS\", \"MAG\", \"LIPASE\", \"AMYLASE\", \"TSH\", \"T4\", " "\"T3\", \"CRP\", \"SED\"    Anesthesia Plan    ASA Status:  2       Anesthesia Type: Epidural.              Consents    Anesthesia Plan(s) and associated risks, benefits, and realistic alternatives discussed. Questions answered and patient/representative(s) expressed understanding.     - Discussed:     - Discussed with:  Patient            Postoperative Care            Comments:    Other Comments: Orders to manage the epidural infusion have been entered, and through coordination with the nurse, we will continute to manage and monitor the patient's labor epidural.  We will continuously be available to adjust as needed thruout the entire L&D process.            Rachel Zavala I have reviewed the pertinent notes and labs in the chart from the past 30 days and (re)examined the patient.  Any updates or changes from those notes are reflected in this note.             "

## 2023-12-12 NOTE — PROGRESS NOTES
Pt is feeling some pressure to push    /67   Pulse 92   Temp 98.1  F (36.7  C) (Temporal)   Resp 16   LMP 03/17/2023   SpO2 100%      moderate variability, no accels or decels    IUPC contractions every 2-4 minutes    SVE complete/+1    A/P: 28 year old para 0 at 38w4d, IOL for GHTN    Starting second stage of labor, pushing with good effort    Jennifer L. Schwab, MD

## 2023-12-12 NOTE — ANESTHESIA PROCEDURE NOTES
"Epidural catheter Procedure Note    Pre-Procedure   Staff -        Anesthesiologist:  Rachel Zavala       Performed By: anesthesiologist       Location: OB       Pre-Anesthestic Checklist: patient identified, IV checked, site marked, risks and benefits discussed, informed consent, monitors and equipment checked, pre-op evaluation, at physician/surgeon's request and post-op pain management  Timeout:       Correct Patient: Yes        Correct Procedure: Yes        Correct Site: Yes        Correct Position: Yes   Procedure Documentation  Procedure: epidural catheter       Patient Position: sitting       Patient Prep/Sterile Barriers: sterile gloves, patient draped       Skin prep: Betadine       Local skin infiltrated with 3 mL of 1% lidocaine.        Insertion Site: L3-4. (midline approach).       Technique: LORT saline        PEÑA at 5 cm.       Needle Type: Symetisy needle       Needle Gauge: 18.        Needle Length (Inches): 3.5           Catheter threaded easily.         3 cm epidural space.         Threaded 8 cm at skin.         # of attempts: 1 and  # of redirects:  0    Assessment/Narrative         Paresthesias: No.       Test dose of mL lidocaine 1.5% w/ 1:200,000 epinephrine at.         Test dose negative, 3 minutes after injection, for signs of intravascular, subdural, or intrathecal injection.       Insertion/Infusion Method: LORT saline       Aspiration negative for Heme or CSF via Epidural Catheter.    Medication(s) Administered   0.2% Ropivacaine (Epidural) - EPIDURAL   5 mL - 12/12/2023 12:37:00 AM   Comments:  Pt tolerated procedure well.   Patient placed in supine + СЕРГЕЙ position post-procedure.   FHTs stable post-procedure.       FOR [facility] ([facility]) ONLY:   Pain Team Contact information: please page the Pain Team Via [employer]. Search \"Pain\". During daytime hours, please page the attending first. At night please page the resident first.      "

## 2023-12-13 LAB — HGB BLD-MCNC: 9.4 G/DL (ref 11.7–15.7)

## 2023-12-13 PROCEDURE — 120N000012 HC R&B POSTPARTUM

## 2023-12-13 PROCEDURE — 85018 HEMOGLOBIN: CPT | Performed by: OBSTETRICS & GYNECOLOGY

## 2023-12-13 PROCEDURE — 250N000013 HC RX MED GY IP 250 OP 250 PS 637: Performed by: OBSTETRICS & GYNECOLOGY

## 2023-12-13 PROCEDURE — 36415 COLL VENOUS BLD VENIPUNCTURE: CPT | Performed by: OBSTETRICS & GYNECOLOGY

## 2023-12-13 RX ORDER — NIFEDIPINE 30 MG/1
30 TABLET, EXTENDED RELEASE ORAL DAILY
Status: DISCONTINUED | OUTPATIENT
Start: 2023-12-13 | End: 2023-12-15 | Stop reason: HOSPADM

## 2023-12-13 RX ORDER — FUROSEMIDE 20 MG
20 TABLET ORAL DAILY
Status: DISCONTINUED | OUTPATIENT
Start: 2023-12-13 | End: 2023-12-15

## 2023-12-13 RX ADMIN — IBUPROFEN 800 MG: 400 TABLET ORAL at 15:28

## 2023-12-13 RX ADMIN — ACETAMINOPHEN 975 MG: 325 TABLET, FILM COATED ORAL at 05:11

## 2023-12-13 RX ADMIN — DOCUSATE SODIUM 50 MG AND SENNOSIDES 8.6 MG 2 TABLET: 8.6; 5 TABLET, FILM COATED ORAL at 21:46

## 2023-12-13 RX ADMIN — DOCUSATE SODIUM 50 MG AND SENNOSIDES 8.6 MG 1 TABLET: 8.6; 5 TABLET, FILM COATED ORAL at 09:06

## 2023-12-13 RX ADMIN — FUROSEMIDE 20 MG: 20 TABLET ORAL at 11:24

## 2023-12-13 RX ADMIN — NIFEDIPINE 30 MG: 30 TABLET, FILM COATED, EXTENDED RELEASE ORAL at 11:24

## 2023-12-13 RX ADMIN — OXYCODONE HYDROCHLORIDE 5 MG: 5 TABLET ORAL at 22:46

## 2023-12-13 RX ADMIN — IBUPROFEN 800 MG: 400 TABLET ORAL at 03:42

## 2023-12-13 RX ADMIN — ACETAMINOPHEN 975 MG: 325 TABLET, FILM COATED ORAL at 17:34

## 2023-12-13 RX ADMIN — IBUPROFEN 800 MG: 400 TABLET ORAL at 09:06

## 2023-12-13 RX ADMIN — ACETAMINOPHEN 975 MG: 325 TABLET, FILM COATED ORAL at 11:23

## 2023-12-13 ASSESSMENT — ACTIVITIES OF DAILY LIVING (ADL)
ADLS_ACUITY_SCORE: 22
ADLS_ACUITY_SCORE: 21
ADLS_ACUITY_SCORE: 22
ADLS_ACUITY_SCORE: 21
ADLS_ACUITY_SCORE: 18
ADLS_ACUITY_SCORE: 21

## 2023-12-13 NOTE — PLAN OF CARE
Data: Lulu Trujillo transferred to room 402 via cart at 2015. Baby transferred via parent's arms.  Action: Receiving unit notified of transfer: Yes. Patient and family notified of room change. Report given to Sunshine HELLER at 2015. Belongings sent to receiving unit. Accompanied by Registered Nurse. Oriented patient to surroundings. Call light within reach. ID bands double-checked with receiving RN.  Response: Patient tolerated transfer and is stable.

## 2023-12-13 NOTE — PLAN OF CARE
Goal Outcome Evaluation:    Plan of Care Reviewed With: patient, spouse    Overall Patient Progress: improving    Vital signs stable except elevated blood pressures and tachycardic, patient states she has been tachycardic since pregnancy. Provider notified about elevated blood pressures, see note for details and orders. Postpartum assessment WDL. Incision covered with dressing, scant dried drainage marked and remains unchanged. Pain controlled with PO tylenol and ibuprofen, patient only received x1 dose of IV toradol due to patient accidentally pulling out IV. Patient ambulating with stand-by assist. Rodas removed @ 0600, due to void by 1000. Breastfeeding poor on cue with full assist from RN, utilizing nipple shield bilaterally for infant latch assist. Patient and infant bonding well. Will continue with current plan of care.

## 2023-12-13 NOTE — PLAN OF CARE
Since initial fundal check in OR, fundus had consistently been slightly deviated to the right side. During recovery, fundus became more deviated to right, duenas appeared to be draining properly.  With 19:45 fundal check, moderate amount of bright red blood expressed. This blood appeared to be more of a liquid consistency than usual blood, therefore we were questioning if the duenas was draining properly, though there was fresh urine in the duenas tubing. Duenas was replaced and fundus became midline after.  Duenas to remain in situ until morning per Dr Tuttle   Patient/surrogate refused vaccine...

## 2023-12-13 NOTE — ANESTHESIA POSTPROCEDURE EVALUATION
Patient: Lulu Trujillo    Procedure: Procedure(s):   section       Anesthesia Type:  Epidural    Note:  Disposition: Outpatient   Postop Pain Control: Uneventful            Sign Out: Well controlled pain   PONV: No   Neuro/Psych: Uneventful            Sign Out: Acceptable/Baseline neuro status   Airway/Respiratory: Uneventful            Sign Out: Acceptable/Baseline resp. status   CV/Hemodynamics: Uneventful            Sign Out: Acceptable CV status; No obvious hypovolemia; No obvious fluid overload   Other NRE: NONE   DID A NON-ROUTINE EVENT OCCUR? No    Event details/Postop Comments:  The patient denies numbness, weakness, or tingling in either of the lower extremities; denies positional headache; and denies significant back pain. The patient was then counseled on any potential concerning symptoms and if/when to reach out for further guidance. They expressed understanding of the instructions and felt comfortable with the plan.           Last vitals:  Vitals:    23 1545 23 1600 23 1800   BP:  130/80 131/61   Pulse:      Resp:  17    Temp: 36.7  C (98.1  F)     SpO2:          Electronically Signed By: Jak Cowan MD  2023  6:22 PM

## 2023-12-13 NOTE — PROGRESS NOTES
S: Overall doing well. Pain controlled. Tolerating PO. +Flatus, no BM. Lochia normal. Able to void. Working on breast feeding. Denies HA. Feeling a little lightheaded    O: Temp: 98  F (36.7  C) Temp src: Oral BP: 122/80 Pulse: 100   Resp: 16 SpO2: 99 % O2 Device: None (Room air)      Gen: NAD  Abd: Soft, mildly distended, appropriately TTP, fundus firm. Bandage dry with minimal strikethrough. Erythema noted along where surgical drape was placed  Ext: NT/1+ edema to thighs bilaterally    Labs:   Hgb 9.6 yesterday PM -> 9.4 this AM  Creatinine 0.80  Platelets 168  AST 21  ALT 6  UPC 0.23    A/P: 29 y/o  POD#1 s/p pLTCS for failure to descend after IOL for GHTN    Recovering appropriately post-op    GHTN - mild range blood pressures noted yesterday evening. Started on nifedipine 30 mg PO XL daily, will continue  Lower extremity edema- recommend starting daily Lasix 20 mg daily  Repeat pre-eclampsia labs normal yesterday  Continue to monitor blood pressures, symptoms    Rh+, rubella non-immune: recommend MMR    May try hydrocortisone cream to area of rash on abdomen if needed (likely contact irritation)    Anticipate ongoing stay until likely POD3    Ilana Rodriguez MD 23 11:08 AM

## 2023-12-13 NOTE — LACTATION NOTE
Initial visit with ARON Lawson and baby.    Breastfeeding general information reviewed.   Advised to breastfeed exclusively, on demand, avoid pacifiers, bottles and formula unless medically indicated.  Encouraged rooming in, skin to skin, feeding on demand 8-12x/day or sooner if baby cues.  Explained benefits of holding and skin to skin.  Encouraged lots of skin to skin. Instructed on hand expression.  LC obtained gtts on the left breast and mother preformed return demonstration bilaterally.   Outpatient resources reviewed.  Has a breast pump for home.    Continues to nurse well with shield per mom. No further questions at this time.   Will follow as needed.   Sheila Higuera BSN, RN, PHN, RNC-MNN, IBCLC

## 2023-12-13 NOTE — PROVIDER NOTIFICATION
12/12/23 2213   Provider Notification   Provider Name/Title Dr. Tuttle   Method of Notification Phone;Electronic Page   Request Evaluate-Remote   Notification Reason Vital Signs Change     Dr. Tuttle (on-call for Dr. Rodriguez) was paged @ 1653 regarding patient's elevated blood pressures. 2100 BP was 136/90, 2200 BP was 139/92, per orders, provider must be notified with two consecutive BP's >140/>90. Dr. Tuttle ordered a STAT lab draw (Hgb, Plt, AST, ALT, BUN, Creat), a urine PCR, and a one time dose of Nifedipine XL 30mg. Dr. Tuttle also stated that he would only like to be notified of patient's BP's >150/>90. Orders placed with telephone readback.

## 2023-12-13 NOTE — PROGRESS NOTES
Patient arrived to [facility] @ 2015 on cart with infant in arms, accompanied by spouse and parents with personal belongings. Infant transferred to [facility] via RN. Patient transferred from cart to bed via hovermat with assist x3. Bedside report received from L&D RN. Safety bands verified on patient, infant, and spouse with L&D RN. Patient and spouse oriented to room, safety features covered.

## 2023-12-14 PROCEDURE — 250N000013 HC RX MED GY IP 250 OP 250 PS 637: Performed by: OBSTETRICS & GYNECOLOGY

## 2023-12-14 PROCEDURE — 120N000012 HC R&B POSTPARTUM

## 2023-12-14 RX ADMIN — OXYCODONE HYDROCHLORIDE 5 MG: 5 TABLET ORAL at 11:35

## 2023-12-14 RX ADMIN — OXYCODONE HYDROCHLORIDE 5 MG: 5 TABLET ORAL at 06:17

## 2023-12-14 RX ADMIN — IBUPROFEN 800 MG: 400 TABLET ORAL at 06:16

## 2023-12-14 RX ADMIN — ACETAMINOPHEN 975 MG: 325 TABLET, FILM COATED ORAL at 00:28

## 2023-12-14 RX ADMIN — OXYCODONE HYDROCHLORIDE 5 MG: 5 TABLET ORAL at 20:53

## 2023-12-14 RX ADMIN — DOCUSATE SODIUM 50 MG AND SENNOSIDES 8.6 MG 2 TABLET: 8.6; 5 TABLET, FILM COATED ORAL at 08:50

## 2023-12-14 RX ADMIN — IBUPROFEN 800 MG: 400 TABLET ORAL at 18:24

## 2023-12-14 RX ADMIN — NIFEDIPINE 30 MG: 30 TABLET, FILM COATED, EXTENDED RELEASE ORAL at 08:50

## 2023-12-14 RX ADMIN — FUROSEMIDE 20 MG: 20 TABLET ORAL at 08:50

## 2023-12-14 RX ADMIN — DOCUSATE SODIUM 50 MG AND SENNOSIDES 8.6 MG 2 TABLET: 8.6; 5 TABLET, FILM COATED ORAL at 20:45

## 2023-12-14 RX ADMIN — ACETAMINOPHEN 975 MG: 325 TABLET, FILM COATED ORAL at 18:24

## 2023-12-14 RX ADMIN — ACETAMINOPHEN 975 MG: 325 TABLET, FILM COATED ORAL at 12:44

## 2023-12-14 RX ADMIN — OXYCODONE HYDROCHLORIDE 5 MG: 5 TABLET ORAL at 16:17

## 2023-12-14 RX ADMIN — IBUPROFEN 800 MG: 400 TABLET ORAL at 12:44

## 2023-12-14 RX ADMIN — ACETAMINOPHEN 975 MG: 325 TABLET, FILM COATED ORAL at 06:16

## 2023-12-14 RX ADMIN — IBUPROFEN 800 MG: 400 TABLET ORAL at 00:28

## 2023-12-14 ASSESSMENT — ACTIVITIES OF DAILY LIVING (ADL)
ADLS_ACUITY_SCORE: 21
ADLS_ACUITY_SCORE: 21
ADLS_ACUITY_SCORE: 18
ADLS_ACUITY_SCORE: 18
ADLS_ACUITY_SCORE: 21
ADLS_ACUITY_SCORE: 18
ADLS_ACUITY_SCORE: 21
ADLS_ACUITY_SCORE: 21
ADLS_ACUITY_SCORE: 18
ADLS_ACUITY_SCORE: 18

## 2023-12-14 NOTE — PROGRESS NOTES
[facility]    Post-Partum Progress Note   Obstetrics and Gynecology    Assessment & Plan     ASSESSMENT:  -2 Days Post-Op  Procedure(s):   section  Prenatal course was complicated by    Patient Active Problem List    Diagnosis Date Noted    38 weeks gestation of pregnancy 2023     Priority: Medium    Status post primary low transverse  section 2023     Priority: Medium    Gestational hypertension, third trimester 2023     Priority: Medium    Arrest of descent, delivered, current hospitalization 2023     Priority: Medium    Encounter for induction of labor 12/10/2023     Priority: Medium    Cafe-au-lait spots 04/15/2014     Priority: Medium    Left hip pain 2012     Priority: Medium    Lumbago 2012     Priority: Medium    Cervicalgia 2012     Priority: Medium     Doing well.  No excessive bleeding  Pain well-controlled.  Good response to Nifedipine, BP normotensive.     PLAN:  Ambulation encouraged  Breast feeding strategies discussed  Monitor wound for signs of infection  Pain control measures as needed  Continue to monitor BP.  Continue Lasix for diuresis, help with edema.  Anticipate discharge tomorrow    Andra Gomez MD, MD  [facility]      Interval History   The baby is well.  No problems with feeding.  Pain is adequately controlled. No immediate concerns identified.      Physical Exam   B/P: 113/81, T: 97.8, P: 89, R: 16    Uterine fundus is firm, non-tender and at the level of the umbilicus  Incision C/D/I  Significant lower extremity edema    Data   Recent Labs   Lab Test 12/10/23  2155   AS Negative     Recent Labs   Lab Test 23  0858 23  2255   HGB 9.4* 9.6*     Recent Labs   Lab Test 22  1550   RUQIGG No detectable antibody.

## 2023-12-14 NOTE — PLAN OF CARE
Goal Outcome Evaluation:      Plan of Care Reviewed With: patient, spouse    Overall Patient Progress: improvingOverall Patient Progress: improving     Vital signs stable, taking Nifedipine for BP's. Voiding without difficulty. Lung sounds clear and equal. Using Tylenol/Ibuprofen for pain management, denies the need for Oxycodone at this time. Up ambulating free of dizziness. Working on breastfeeding every 2-3 hours. Encouraged to call with questions/concerns. Will continue to monitor.

## 2023-12-14 NOTE — PLAN OF CARE
Goal Outcome Evaluation:      Plan of Care Reviewed With: patient, spouse    Overall Patient Progress: improvingOverall Patient Progress: improving     Fundus firm and bleeding wnl.  VSS.  Incision clean, dry, intact and covered with steri strips.  Rates pain 2-3/10 and taking scheduled tylenol, ibuprofen and oxycodone with good relief.  Up independently.  Using abdominal binder.  Passing flatus and tolerating regular diet.  Encouraged to call with questions or concerns.

## 2023-12-14 NOTE — PLAN OF CARE
Goal Outcome Evaluation:      Plan of Care Reviewed With: patient, spouse    Overall Patient Progress: improvingOverall Patient Progress: improving     Vital signs stable. Postpartum assessment WDL. Incision dressing removed, steri strips in place. Pain controlled with tylenol, ibuprofen and oxycodone. Patient taking furosemide and nifedipine daily. Patient ambulating well independently. Pedal edema +3 overnight. Patient is elevating her legs while in bed. Patient reports passing gas. Breastfeeding on cue with minimal assist. Patient and infant bonding well. Will continue with current plan of care.

## 2023-12-15 VITALS
RESPIRATION RATE: 16 BRPM | BODY MASS INDEX: 29.22 KG/M2 | SYSTOLIC BLOOD PRESSURE: 119 MMHG | HEART RATE: 102 BPM | TEMPERATURE: 98 F | WEIGHT: 145.9 LBS | DIASTOLIC BLOOD PRESSURE: 81 MMHG | OXYGEN SATURATION: 100 %

## 2023-12-15 PROBLEM — O13.9 GESTATIONAL HYPERTENSION: Status: ACTIVE | Noted: 2023-12-15

## 2023-12-15 PROBLEM — R60.0 BILATERAL LOWER EXTREMITY EDEMA: Status: ACTIVE | Noted: 2023-12-15

## 2023-12-15 PROCEDURE — 250N000013 HC RX MED GY IP 250 OP 250 PS 637: Performed by: OBSTETRICS & GYNECOLOGY

## 2023-12-15 RX ORDER — FUROSEMIDE 20 MG
20 TABLET ORAL
Qty: 14 TABLET | Refills: 0 | Status: SHIPPED | OUTPATIENT
Start: 2023-12-15 | End: 2023-12-22

## 2023-12-15 RX ORDER — IBUPROFEN 800 MG/1
800 TABLET, FILM COATED ORAL EVERY 6 HOURS
Qty: 60 TABLET | Refills: 1 | Status: SHIPPED | OUTPATIENT
Start: 2023-12-15

## 2023-12-15 RX ORDER — OXYCODONE HYDROCHLORIDE 5 MG/1
5-10 TABLET ORAL EVERY 4 HOURS PRN
Qty: 20 TABLET | Refills: 0 | Status: SHIPPED | OUTPATIENT
Start: 2023-12-15

## 2023-12-15 RX ORDER — FUROSEMIDE 20 MG
20 TABLET ORAL
Status: DISCONTINUED | OUTPATIENT
Start: 2023-12-15 | End: 2023-12-15 | Stop reason: HOSPADM

## 2023-12-15 RX ORDER — NIFEDIPINE 30 MG
30 TABLET, EXTENDED RELEASE ORAL DAILY
Qty: 30 TABLET | Refills: 3 | Status: SHIPPED | OUTPATIENT
Start: 2023-12-16

## 2023-12-15 RX ORDER — ACETAMINOPHEN 325 MG/1
975 TABLET ORAL EVERY 6 HOURS
Qty: 60 TABLET | Refills: 1 | Status: SHIPPED | OUTPATIENT
Start: 2023-12-15

## 2023-12-15 RX ORDER — AMOXICILLIN 250 MG
1-2 CAPSULE ORAL 2 TIMES DAILY PRN
Qty: 60 TABLET | Refills: 1 | Status: SHIPPED | OUTPATIENT
Start: 2023-12-15

## 2023-12-15 RX ADMIN — FUROSEMIDE 20 MG: 20 TABLET ORAL at 08:56

## 2023-12-15 RX ADMIN — DOCUSATE SODIUM 50 MG AND SENNOSIDES 8.6 MG 2 TABLET: 8.6; 5 TABLET, FILM COATED ORAL at 07:29

## 2023-12-15 RX ADMIN — IBUPROFEN 800 MG: 400 TABLET ORAL at 06:55

## 2023-12-15 RX ADMIN — OXYCODONE HYDROCHLORIDE 5 MG: 5 TABLET ORAL at 01:10

## 2023-12-15 RX ADMIN — ACETAMINOPHEN 975 MG: 325 TABLET, FILM COATED ORAL at 13:03

## 2023-12-15 RX ADMIN — IBUPROFEN 800 MG: 400 TABLET ORAL at 13:03

## 2023-12-15 RX ADMIN — NIFEDIPINE 30 MG: 30 TABLET, FILM COATED, EXTENDED RELEASE ORAL at 08:56

## 2023-12-15 RX ADMIN — IBUPROFEN 800 MG: 400 TABLET ORAL at 01:09

## 2023-12-15 RX ADMIN — ACETAMINOPHEN 975 MG: 325 TABLET, FILM COATED ORAL at 06:55

## 2023-12-15 RX ADMIN — ACETAMINOPHEN 975 MG: 325 TABLET, FILM COATED ORAL at 01:09

## 2023-12-15 RX ADMIN — OXYCODONE HYDROCHLORIDE 5 MG: 5 TABLET ORAL at 07:29

## 2023-12-15 ASSESSMENT — ACTIVITIES OF DAILY LIVING (ADL)
ADLS_ACUITY_SCORE: 18

## 2023-12-15 NOTE — PLAN OF CARE
Goal Outcome Evaluation:      Plan of Care Reviewed With: patient, spouse    Overall Patient Progress: improvingOverall Patient Progress: improving       D: VSS, assessments WDL.  I: Pt received complete discharge paperwork and home medications as filled by discharge pharmacy -tylenol, lasix, ibuprofen, nifedipine. Pt was given times of last dose for all discharge medications in writing on discharge medication sheets. Discharge teaching included home medication, pain management activity restrictions, postpartum cares and symptoms of infection.   A: Discharge outcomes on care plan met. Mother states understanding and comfort with self and baby cares.  P: Pt discharged to home. Pt was discharged with baby, and bands checked at time of discharge. Pt was accompanied by , nurse and baby, and left with personal belongings. Home care sent. Pt to follow up with OB per MD order. Pt had no further questions at this time and no unmet needs were identified.

## 2023-12-15 NOTE — DISCHARGE INSTRUCTIONS
Postop  Birth Instructions    Activity     Do not lift more than 10 pounds for 6 weeks after surgery.  Ask family and friends for help when you need it.  No driving until you have stopped taking your pain medications (usually two weeks after surgery).  No heavy exercise or activity for 6 weeks.  Don't do anything that will put a strain on your surgery site.  Don't strain when using the toilet.  Your care team may prescribe a stool softener if you have problems with your bowel movements.     To care for your incision:     Keep the incision clean and dry.  Do not soak your incision in water. No swimming or hot tubs until it has fully healed. You may soak in the bathtub if the water level is below your incision.  Do not use peroxide, gel, cream, lotion, or ointment on your incision.  Adjust your clothes to avoid pressure on your surgery site (check the elastic in your underwear for example).     You may see a small amount of clear or pink drainage and this is normal.  Check with your health care provider:     If the drainage increases or has an odor.  If the incision reddens, you have swelling, or develop a rash.  If you have increased pain and the medicine we prescribed doesn't help.  If you have a fever above 100.4 F (38 C) with or without chills when placing thermometer under your tongue.   The area around your incision (surgery wound), will feel numb.  This is normal. The numbness should go away in less than a year.     Keep your hands clean:  Always wash your hands before touching your incision (surgery wound). This helps reduce your risk of infection. If your hands aren't dirty, you may use an alcohol hand-rub to clean your hands. Keep your nails clean and short.    Call your healthcare provider if you have any of these symptoms:     You soak a sanitary pad with blood within 1 hour, or you see blood clots larger than a golf ball.  Bleeding that lasts more than 6 weeks.  Vaginal discharge that smells  bad.  Severe pain, cramping or tenderness in your lower belly area.  A need to urinate more frequently (use the toilet more often), more urgently (use the toilet very quickly), or it burns when you urinate.  Nausea and vomiting.  Redness, swelling or pain around a vein in your leg.  Problems breastfeeding or a red or painful area on your breast.  Chest pain and cough or are gasping for air.  Problems with coping with sadness, anxiety or depression. If you have concerns about hurting yourself or the baby, call your provider immediately.    You have questions or concerns after you return home.        Preeclampsia   Call your doctor right away if you have any of the following:  - Edema (swelling) in your face or hands  - Rapid weight gain-about 1 pound or more in a day  - Headache  - Abdominal pain on your right side  - Vision problems (flashes or spots)  - You have questions or concerns once you return home.          Warning Signs after Having a Baby    Keep this paper on your fridge or somewhere else where you can see it.    Call your provider if you have any of these symptoms up to 12 weeks after having your baby.    Thoughts of hurting yourself or your baby  Pain in your chest or trouble breathing  Severe headache not helped by pain medicine  Eyesight concerns (blurry vision, seeing spots or flashes of light, other changes to eyesight)  Fainting, shaking or other signs of a seizure    Call 9-1-1 if you feel that it is an emergency.     The symptoms below can happen to anyone after giving birth. They can be very serious. Call your provider if you have any of these warning signs.    My provider s phone number: _______________________    Losing too much blood (hemorrhage)    Call your provider if you soak through a pad in less than an hour or pass blood clots bigger than a golf ball. These may be signs that you are bleeding too much.    Blood clots in the legs or lungs    After you give birth, your body naturally  clots its blood to help prevent blood loss. Sometimes this increased clotting can happen in other areas of the body, like the legs or lungs. This can block your blood flow and be very dangerous.     Call your provider if you:  Have a red, swollen spot on the back of your leg that is warm or painful when you touch it.   Are coughing up blood.     Infection    Call your provider if you have any of these symptoms:  Fever of 100.4 F (38 C) or higher.  Pain or redness around your stitches if you had an incision.   Any yellow, white, or green fluid coming from places where you had stitches or surgery.    Mood Problems (postpartum depression)    Many people feel sad or have mood changes after having a baby. But for some people, these mood swings are worse.     Call your provider right away if you feel so anxious or nervous that you can't care for yourself or your baby.    Preeclampsia (high blood pressure)    Even if you didn't have high blood pressure when you were pregnant, you are at risk for the high blood pressure disease called preeclampsia. This risk can last up to 12 weeks after giving birth.     Call your provider if you have:   Pain on your right side under your rib cage  Sudden swelling in the hands and face    Remember: You know your body. If something doesn't feel right, get medical help.     For informational purposes only. Not to replace the advice of your health care provider. Copyright 2020 [facility]. All rights reserved. Clinically reviewed by Sharmaine Maya, RNC-OB, MSN. [employer] [id number] - Rev 02/23.

## 2023-12-15 NOTE — PLAN OF CARE
Goal Outcome Evaluation:      Plan of Care Reviewed With: patient, spouse    Overall Patient Progress: improvingOverall Patient Progress: improving     Fundus firm and bleeding wnl.  VSS.  Incision clean, dry, intact and covered with steri strips.  Rates pain 2-3/10 and taking tylenol, ibuprofen and oxycodone with good relief.  Up independently.  Using abdominal binder.  Passing flatus and tolerating regular diet.  Encouraged to call with questions or concerns.

## 2023-12-15 NOTE — LACTATION NOTE
Routine visit. Baby latched on well, lips flanged and nutritive suckling pattern noted.  Nursed for 15 minutes.  Baby unlatched himself and fell asleep.   Instructed on signs/symptoms of engorgement/ plugged ducts and mastitis.  Instructed on comfort measures and when to call MD.  Parent are Very appreciative of all the help and information.    Continues to nurse well per mom. No further questions at this time.   Will follow as needed.   Sheila HUERTAN, RN, PHN, RNC-MNN, IBCLC

## 2023-12-15 NOTE — DISCHARGE SUMMARY
[facility] Discharge Summary    Lulu Trujillo MRN# [id number]   Age: 28 year old YOB: 1995     Date of Admission:  12/10/2023  Date of Discharge::  12/15/2023  Admitting Physician:  Jennifer Lani Schwab, MD  Discharge Physician:  Jose D Tuttle MD     Home clinic: [facility]          Admission Diagnoses:   Pregnancy at 38W 3D  Gestational hypertension          Discharge Diagnosis:     Primary  section  Postpartum hypertension  Bilateral lower extremity edema          Procedures:     Procedure(s): Primary low transverse  section       No procedures performed during this admission           Medications Prior to Admission:     Medications Prior to Admission   Medication Sig Dispense Refill Last Dose    cetirizine (ZYRTEC) 5 MG tablet Take 5 mg by mouth daily   12/10/2023    diphenhydrAMINE (BENADRYL) 25 MG capsule Take 25 mg by mouth every 6 hours as needed for itching or allergies   2023    Prenatal Vit-Fe Fumarate-FA (PNV PRENATAL PLUS MULTIVITAMIN) 27-1 MG TABS per tablet Take 1 tablet by mouth daily   12/10/2023    [DISCONTINUED] ferrous sulfate (FEROSUL) 325 (65 Fe) MG tablet Take 325 mg by mouth daily (with breakfast)   2023             Discharge Medications:     Current Discharge Medication List        START taking these medications    Details   acetaminophen (TYLENOL) 325 MG tablet Take 3 tablets (975 mg) by mouth every 6 hours  Qty: 60 tablet, Refills: 1    Associated Diagnoses: Status post primary low transverse  section      furosemide (LASIX) 20 MG tablet Take 1 tablet (20 mg) by mouth 2 times daily for 7 days  Qty: 14 tablet, Refills: 0    Associated Diagnoses: Bilateral lower extremity edema      ibuprofen (ADVIL/MOTRIN) 800 MG tablet Take 1 tablet (800 mg) by mouth every 6 hours  Qty: 60 tablet, Refills: 1    Associated Diagnoses: Status post primary low transverse  section      NIFEdipine ER OSMOTIC (ADALAT  CC) 30 MG 24 hr tablet Take 1 tablet (30 mg) by mouth daily  Qty: 30 tablet, Refills: 3    Associated Diagnoses: Postpartum hypertension           CONTINUE these medications which have NOT CHANGED    Details   cetirizine (ZYRTEC) 5 MG tablet Take 5 mg by mouth daily      diphenhydrAMINE (BENADRYL) 25 MG capsule Take 25 mg by mouth every 6 hours as needed for itching or allergies      Prenatal Vit-Fe Fumarate-FA (PNV PRENATAL PLUS MULTIVITAMIN) 27-1 MG TABS per tablet Take 1 tablet by mouth daily           STOP taking these medications       ferrous sulfate (FEROSUL) 325 (65 Fe) MG tablet Comments:   Reason for Stopping:                     Consultations:   No consultations were requested during this admission          Brief History of Labor or Admission:   Pregnancy at 38W 3D gestation.  Gestational hypertension.  Admitted for induction of labor.  Proceeded to have a primary  Section.           Hospital Course:   The patient's hospital course was unremarkable.  She recovered as anticipated and experienced no post-operative complications.  On discharge, her pain was well controlled. Vaginal bleeding is similar to peak menstrual flow.  Voiding without difficulty.  Ambulating well and tolerating a normal diet.  No fever or significant wound drainage.  Breastfeeding well.  Infant is stable.  Status post bowel movement.  She was discharged on post-partum day #3.    Post-partum hemoglobin:   Hemoglobin   Date Value Ref Range Status   2023 9.4 (L) 11.7 - 15.7 g/dL Final             Discharge Instructions and Follow-Up:     Discharge diet: Regular   Discharge activity: Lifting restricted to 20 pounds  No heavy lifting, pushing, pulling for 6 week(s)  No driving for 1-2 week(s)  No driving or operating machinery while on narcotic analgesics  Pelvic rest: abstain from intercourse and do not use tampons for 6 week(s)   Discharge follow-up: Follow up with Dr. Schwab in 6 weeks   Wound care: Drink plenty of  fluids  Ice to area for comfort  Keep wound clean and dry  Steri-strips off in 7 days           Discharge Disposition:     Discharged to home      Attestation:  I have reviewed today's vital signs, notes, medications, labs and imaging.    Jose D Tuttle MD

## 2023-12-15 NOTE — PLAN OF CARE
Vital signs stable, BP WNL overnight. Patient denies headaches, visual disturbances, or epigastric pain. Reflexes 2+, no clonus. Postpartum assessment WDL except for 3+ bilateral lower extremity edema- patient taking daily lasix per orders. One egg-sized clot passed this morning, fundus firm and midline with scant flow. Incision CDI with steri strips. Pain controlled with tylenol, ibuprofen, and oxycodone, PRN ice packs to incision. Patient ambulating independently, free of dizziness or lightheadedness. Patient reports passing gas and having bowel movement. Voiding spontaneously without difficulty. Breastfeeding every 2-3 hours with minimal assistance, supplementing infant with formula via bottle due to infant's weight loss. Patient pumping after feedings. Patient and infant bonding well. Will continue with current plan of care.     Goal Outcome Evaluation:      Plan of Care Reviewed With: patient, spouse    Overall Patient Progress: improving

## 2023-12-15 NOTE — PROGRESS NOTES
[facility] Obstetrics Post-Op / Progress Note         Assessment and Plan:    Assessment:   Post-operative day #3  Low transverse primary  section  L&D complications: Arrest of descent      Doing well.  Clean wound without signs of infection.  Bilateral lower extremity edema.  No immediate surgical complications identified.  No excessive bleeding  Pain well-controlled.      Plan:   Discharge later today           Interval History:   Doing well.  Pain is well-controlled.  No fevers.  No history of wound drainage, warmth or significant erythema.  Good appetite.  Denies chest pain, shortness of breath, nausea or vomiting.  Ambulatory.  Breastfeeding well.          Significant Problems:    None          Review of Systems:    The patient denies any chest pain, shortness of breath, excessive pain, fever, chills, purulent drainage from the wound, nausea or vomiting.          Medications:   All medications related to the patient's surgery have been reviewed          Physical Exam:   All vitals stable  Patient Vitals for the past 24 hrs:   BP Temp Temp src Pulse Resp Weight   12/15/23 0810 119/81 98  F (36.7  C) Oral 102 16 --   12/15/23 0729 -- -- -- -- 16 --   12/15/23 0446 -- -- -- -- -- 66.9 kg (147 lb 7.8 oz)   12/15/23 0405 119/81 -- -- 98 -- --   12/15/23 0347 (!) 127/90 97.7  F (36.5  C) Oral 104 16 --   12/15/23 0155 -- -- -- -- 16 --   12/15/23 0109 -- -- -- -- 16 --   23 2334 117/71 98  F (36.7  C) Oral 102 16 --   23 1530 132/83 97.9  F (36.6  C) Axillary 94 16 --     Wound clean and dry with minimal or no drainage.  Surrounding skin with minimal erythema.          Data:   All laboratory data related to this surgery reviewed  Hemoglobin   Date Value Ref Range Status   2023 9.4 (L) 11.7 - 15.7 g/dL Final   2023 9.6 (L) 11.7 - 15.7 g/dL Final   12/10/2023 11.7 11.7 - 15.7 g/dL Final     Lab Results   Component Value Date    WBC 6.8 12/10/2023    HGB 9.4 (L) 2023     HGB 9.6 (L) 12/12/2023    HGB 11.7 12/10/2023    HCT 34.0 (L) 12/10/2023     12/12/2023     12/10/2023    CHLORIDE 107 11/27/2023    BUN 15.2 12/12/2023    CR 0.80 12/12/2023    AST 21 12/12/2023    ALT 6 12/12/2023     No imaging studies have been ordered    Jose D Tuttle MD

## 2024-06-23 ENCOUNTER — HEALTH MAINTENANCE LETTER (OUTPATIENT)
Age: 29
End: 2024-06-23

## 2025-05-29 ENCOUNTER — HOSPITAL ENCOUNTER (EMERGENCY)
Facility: CLINIC | Age: 30
Discharge: HOME | End: 2025-05-29
Attending: EMERGENCY MEDICINE
Payer: COMMERCIAL

## 2025-05-29 VITALS
TEMPERATURE: 97.5 F | DIASTOLIC BLOOD PRESSURE: 77 MMHG | OXYGEN SATURATION: 99 % | WEIGHT: 124 LBS | RESPIRATION RATE: 16 BRPM | HEIGHT: 59 IN | SYSTOLIC BLOOD PRESSURE: 117 MMHG | HEART RATE: 83 BPM | BODY MASS INDEX: 25 KG/M2

## 2025-05-29 DIAGNOSIS — Z87.59 HISTORY OF GESTATIONAL HYPERTENSION: ICD-10-CM

## 2025-05-29 DIAGNOSIS — R53.81 MALAISE: ICD-10-CM

## 2025-05-29 DIAGNOSIS — Z3A.22 22 WEEKS GESTATION OF PREGNANCY: ICD-10-CM

## 2025-05-29 LAB
ALBUMIN SERPL BCG-MCNC: 4 G/DL (ref 3.5–5.2)
ALBUMIN UR-MCNC: NEGATIVE MG/DL
ALP SERPL-CCNC: 93 U/L (ref 40–150)
ALT SERPL W P-5'-P-CCNC: 5 U/L (ref 0–50)
ANION GAP SERPL CALCULATED.3IONS-SCNC: 13 MMOL/L (ref 7–15)
APPEARANCE UR: ABNORMAL
AST SERPL W P-5'-P-CCNC: 16 U/L (ref 0–45)
ATRIAL RATE - MUSE: 97 BPM
BASOPHILS # BLD AUTO: 0 10E3/UL (ref 0–0.2)
BASOPHILS NFR BLD AUTO: 0 %
BILIRUB SERPL-MCNC: 0.2 MG/DL
BILIRUB UR QL STRIP: NEGATIVE
BUN SERPL-MCNC: 4.7 MG/DL (ref 6–20)
CALCIUM SERPL-MCNC: 9.5 MG/DL (ref 8.8–10.4)
CHLORIDE SERPL-SCNC: 106 MMOL/L (ref 98–107)
COLOR UR AUTO: ABNORMAL
CREAT SERPL-MCNC: 0.4 MG/DL (ref 0.51–0.95)
DIASTOLIC BLOOD PRESSURE - MUSE: NORMAL MMHG
EGFRCR SERPLBLD CKD-EPI 2021: >90 ML/MIN/1.73M2
EOSINOPHIL # BLD AUTO: 0.1 10E3/UL (ref 0–0.7)
EOSINOPHIL NFR BLD AUTO: 1 %
ERYTHROCYTE [DISTWIDTH] IN BLOOD BY AUTOMATED COUNT: 13 % (ref 10–15)
GLUCOSE BLDC GLUCOMTR-MCNC: 86 MG/DL (ref 70–99)
GLUCOSE SERPL-MCNC: 94 MG/DL (ref 70–99)
GLUCOSE UR STRIP-MCNC: NEGATIVE MG/DL
HCO3 SERPL-SCNC: 18 MMOL/L (ref 22–29)
HCT VFR BLD AUTO: 36.5 % (ref 35–47)
HGB BLD-MCNC: 12.5 G/DL (ref 11.7–15.7)
HGB UR QL STRIP: NEGATIVE
IMM GRANULOCYTES # BLD: 0 10E3/UL
IMM GRANULOCYTES NFR BLD: 0 %
INTERPRETATION ECG - MUSE: NORMAL
KETONES UR STRIP-MCNC: NEGATIVE MG/DL
LEUKOCYTE ESTERASE UR QL STRIP: NEGATIVE
LYMPHOCYTES # BLD AUTO: 1.6 10E3/UL (ref 0.8–5.3)
LYMPHOCYTES NFR BLD AUTO: 22 %
MAGNESIUM SERPL-MCNC: 2 MG/DL (ref 1.7–2.3)
MCH RBC QN AUTO: 31.7 PG (ref 26.5–33)
MCHC RBC AUTO-ENTMCNC: 34.2 G/DL (ref 31.5–36.5)
MCV RBC AUTO: 93 FL (ref 78–100)
MONOCYTES # BLD AUTO: 0.4 10E3/UL (ref 0–1.3)
MONOCYTES NFR BLD AUTO: 6 %
MUCOUS THREADS #/AREA URNS LPF: PRESENT /LPF
NEUTROPHILS # BLD AUTO: 5.3 10E3/UL (ref 1.6–8.3)
NEUTROPHILS NFR BLD AUTO: 71 %
NITRATE UR QL: NEGATIVE
NRBC # BLD AUTO: 0 10E3/UL
NRBC BLD AUTO-RTO: 0 /100
P AXIS - MUSE: 72 DEGREES
PH UR STRIP: 7.5 [PH] (ref 5–7)
PLATELET # BLD AUTO: 327 10E3/UL (ref 150–450)
POTASSIUM SERPL-SCNC: 4.6 MMOL/L (ref 3.4–5.3)
PR INTERVAL - MUSE: 138 MS
PROT SERPL-MCNC: 7.5 G/DL (ref 6.4–8.3)
QRS DURATION - MUSE: 70 MS
QT - MUSE: 326 MS
QTC - MUSE: 414 MS
R AXIS - MUSE: 53 DEGREES
RBC # BLD AUTO: 3.94 10E6/UL (ref 3.8–5.2)
RBC URINE: 0 /HPF
SODIUM SERPL-SCNC: 137 MMOL/L (ref 135–145)
SP GR UR STRIP: 1.01 (ref 1–1.03)
SQUAMOUS EPITHELIAL: <1 /HPF
SYSTOLIC BLOOD PRESSURE - MUSE: NORMAL MMHG
T AXIS - MUSE: 37 DEGREES
UROBILINOGEN UR STRIP-MCNC: NORMAL MG/DL
VENTRICULAR RATE- MUSE: 97 BPM
WBC # BLD AUTO: 7.5 10E3/UL (ref 4–11)
WBC URINE: <1 /HPF

## 2025-05-29 PROCEDURE — 82962 GLUCOSE BLOOD TEST: CPT

## 2025-05-29 PROCEDURE — 80053 COMPREHEN METABOLIC PANEL: CPT | Performed by: EMERGENCY MEDICINE

## 2025-05-29 PROCEDURE — 258N000003 HC RX IP 258 OP 636: Performed by: EMERGENCY MEDICINE

## 2025-05-29 PROCEDURE — 99284 EMERGENCY DEPT VISIT MOD MDM: CPT | Mod: 25

## 2025-05-29 PROCEDURE — 81001 URINALYSIS AUTO W/SCOPE: CPT | Performed by: EMERGENCY MEDICINE

## 2025-05-29 PROCEDURE — 85048 AUTOMATED LEUKOCYTE COUNT: CPT | Performed by: EMERGENCY MEDICINE

## 2025-05-29 PROCEDURE — 36415 COLL VENOUS BLD VENIPUNCTURE: CPT | Performed by: EMERGENCY MEDICINE

## 2025-05-29 PROCEDURE — 83735 ASSAY OF MAGNESIUM: CPT | Performed by: EMERGENCY MEDICINE

## 2025-05-29 PROCEDURE — 96360 HYDRATION IV INFUSION INIT: CPT

## 2025-05-29 PROCEDURE — 96361 HYDRATE IV INFUSION ADD-ON: CPT

## 2025-05-29 PROCEDURE — 93005 ELECTROCARDIOGRAM TRACING: CPT

## 2025-05-29 RX ADMIN — SODIUM CHLORIDE 1000 ML: 0.9 INJECTION, SOLUTION INTRAVENOUS at 15:50

## 2025-05-29 ASSESSMENT — COLUMBIA-SUICIDE SEVERITY RATING SCALE - C-SSRS
1. IN THE PAST MONTH, HAVE YOU WISHED YOU WERE DEAD OR WISHED YOU COULD GO TO SLEEP AND NOT WAKE UP?: NO
6. HAVE YOU EVER DONE ANYTHING, STARTED TO DO ANYTHING, OR PREPARED TO DO ANYTHING TO END YOUR LIFE?: NO
2. HAVE YOU ACTUALLY HAD ANY THOUGHTS OF KILLING YOURSELF IN THE PAST MONTH?: NO

## 2025-05-29 ASSESSMENT — ACTIVITIES OF DAILY LIVING (ADL)
ADLS_ACUITY_SCORE: 56
ADLS_ACUITY_SCORE: 56

## 2025-05-29 NOTE — DISCHARGE INSTRUCTIONS
You should continue to drink plenty of fluids to stay well-hydrated, if you are not able to eat much due to your nausea, drinking fluids with some sugar, some salt and some protein can be helpful to help make sure you are getting some nutrition and calories.  I would make an appointment to follow-up with your OB/GYN for recheck of your blood pressure given you had some blood pressures here that were slightly elevated.  Certainly if you develop worsening of your symptoms, are concerned in any way, you should return here to the emergency department.

## 2025-05-29 NOTE — PLAN OF CARE
L&D RN at bedside for fetal doptones.     via doppler.    Pt denies vaginal bleeding, cramping, or any other obstetrical concerns.

## 2025-05-29 NOTE — ED TRIAGE NOTES
"Presents ambulatory to triage with  with complaint of \"feeling out of it\" with tingling in arms and legs and blurry vision. Patient is 22 weeks gestation, no complications with this pregnancy. Patient states pre-eclampsia in last pregnancy. Patient states that sometimes when she feels like this it is her blood sugar being low and she eats and drinks juice, does not have a way to check blood sugar at home.       Patient also endorses she has been throwing up a lot this pregnancy, with less oral intake.  "

## 2025-05-29 NOTE — ED PROVIDER NOTES
"  Emergency Department Note      History of Present Illness     Chief Complaint   Tingling and Eye Problem      HPI   Lulu Trujillo is a 29 year old female  22 weeks into her current pregnancy with a history of pre eclampsia/ gestation hypertension who presents to the Emergency Department for evaluation of a tingling of her arms, legs and upper lips. She reports she started experiencing these tinging sensation this morning. She reports experiencing similar symptoms in her last pregnancy and reports drinking apple juice helped with her symptoms in the past. Today she tried managing her symptoms with apple juice which did not relieve her symptoms which prompted this visit. She did not sleep well last night due to experiencing nausea, vomiting and malaise. She reports she has been experiencing these symptoms off and on through out the pregnancy.  Her symptoms worsens on walking and moving around. However her symptoms worsens yesterday and today. She also reports blurry vision and diarrhea. She takes medications to relive her constipation.  She denies any headache, no focal numbness,weakness of any extremities, abdominal pain or vaginal bleeding/spotting. She reports noticing less fetal movement over yesterday and today.     Independent Historian   None    Review of External Notes   None    Past Medical History     Medical History and Problem List   Anxiety  Gestational Hypertension     Medications   Cetrizine   Diphenhydramine   Hydroxyzine   Ondansetron     Surgical History   Adenoidectomy   C section   Rhinoplasty   Physical Exam     Patient Vitals for the past 24 hrs:   BP Temp Temp src Pulse Resp SpO2 Height Weight   05/29/25 1732 117/77 -- -- 83 -- -- -- --   05/29/25 1520 (!) 130/92 97.5  F (36.4  C) Temporal 97 16 99 % 1.499 m (4' 11\") 56.2 kg (124 lb)     Physical Exam  Constitutional: Alert, attentive, GCS 15  HENT:    Nose: Nose normal.    Mouth/Throat: Oropharynx is clear, mucous membranes are " moist  Eyes: EOM are normal, anicteric, conjugate gaze  CV: regular rate and rhythm   Chest: Effort normal and breath sounds clear without wheezing or rales, symmetric bilaterally   GI:  non tender. No distension. No guarding or rebound.    MSK: No LE edema, no tenderness to palpation of BLE.  Neurological: GCS 15; A/Ox3; Cranial nerves 2-12 intact;   5/5 strength throughout the upper and lower extremities;   sensation intact to light touch throughout the upper and lower extremities;   normal fine motor coordination intact bilaterally;   normal gait   No meningismus    Skin: Skin is warm and dry.     Diagnostics     Lab Results   Labs Ordered and Resulted from Time of ED Arrival to Time of ED Departure   COMPREHENSIVE METABOLIC PANEL - Abnormal       Result Value    Sodium 137      Potassium 4.6      Carbon Dioxide (CO2) 18 (*)     Anion Gap 13      Urea Nitrogen 4.7 (*)     Creatinine 0.40 (*)     GFR Estimate >90      Calcium 9.5      Chloride 106      Glucose 94      Alkaline Phosphatase 93      AST 16      ALT 5      Protein Total 7.5      Albumin 4.0      Bilirubin Total 0.2     ROUTINE UA WITH MICROSCOPIC - Abnormal    Color Urine Straw      Appearance Urine Slightly Cloudy (*)     Glucose Urine Negative      Bilirubin Urine Negative      Ketones Urine Negative      Specific Gravity Urine 1.012      Blood Urine Negative      pH Urine 7.5 (*)     Protein Albumin Urine Negative      Urobilinogen Urine Normal      Nitrite Urine Negative      Leukocyte Esterase Urine Negative      Mucus Urine Present (*)     RBC Urine 0      WBC Urine <1      Squamous Epithelials Urine <1     GLUCOSE BY METER - Normal    GLUCOSE BY METER POCT 86     MAGNESIUM - Normal    Magnesium 2.0     CBC WITH PLATELETS AND DIFFERENTIAL    WBC Count 7.5      RBC Count 3.94      Hemoglobin 12.5      Hematocrit 36.5      MCV 93      MCH 31.7      MCHC 34.2      RDW 13.0      Platelet Count 327      % Neutrophils 71      % Lymphocytes 22      %  Monocytes 6      % Eosinophils 1      % Basophils 0      % Immature Granulocytes 0      NRBCs per 100 WBC 0      Absolute Neutrophils 5.3      Absolute Lymphocytes 1.6      Absolute Monocytes 0.4      Absolute Eosinophils 0.1      Absolute Basophils 0.0      Absolute Immature Granulocytes 0.0      Absolute NRBCs 0.0         Imaging   No orders to display       EKG   ECG results from 05/29/25   EKG 12 lead     Value    Systolic Blood Pressure     Diastolic Blood Pressure     Ventricular Rate 97    Atrial Rate 97    HI Interval 138    QRS Duration 70        QTc 414    P Axis 72    R AXIS 53    T Axis 37    Interpretation ECG      Sinus rhythm  Normal ECG  No previous ECGs available          Independent Interpretation   None    ED Course      Medications Administered   Medications   sodium chloride 0.9% BOLUS 1,000 mL (0 mLs Intravenous Stopped 5/29/25 1732)       Procedures   Procedures     Discussion of Management   L&D, dop tones on baby were WNL.     ED Course   ED Course as of 05/29/25 1743   Thu May 29, 2025   1537 I obtained the history and examined the patient as above.        Additional Documentation  None    Medical Decision Making / Diagnosis     CMS Diagnoses: None    MIPS   None               MDM   Lulu Trujillo is a 29 year old female past medical history significant for gestational hypertension with her previous pregnancy, anxiety, who is 22 weeks pregnant presenting for evaluation of migratory tingling/malaise of her extremities including into the face, she denies overt weakness, denies numbness, but does note some mild blurry vision.  There is no reported headache, I have low suspicion for intracranial hemorrhage, CNS infection, venous sinus thrombosis or CVA.  Neuroimaging deferred, she reported she felt this way previously when her blood sugars were low with her last pregnancy however her blood glucose here was 87.  Her screening labs are notable for mildly low bicarb, this is likely  due to the diarrhea after she just switched antinausea medications she has had significant nausea with this pregnancy.  Remainder of her labs are unremarkable.  With IV fluids, she felt significantly improved, her blood pressure was noted to be borderline high on arrival however without intervention, this improved.  I recommended adequate hydration, close follow-up with her OB/GYN team for recheck of her blood pressure, return precautions were reviewed and she was discharged.    Disposition   The patient was discharged.     Diagnosis     ICD-10-CM    1. Malaise  R53.81       2. 22 weeks gestation of pregnancy  Z3A.22       3. History of gestational hypertension  Z87.59            Chad Cuevas MD  [employer]  5:43 PM 05/29/25         Scribe Disclosure:  I, Maldonado Dela Cruz, am serving as a scribe at 3:32 PM on 5/29/2025 to document services personally performed by Chad Cuevas MD based on my observations and the provider's statements to me.        Chad Cuevas MD  05/29/25 9440

## 2025-05-31 ENCOUNTER — HOSPITAL ENCOUNTER (EMERGENCY)
Facility: CLINIC | Age: 30
Discharge: HOME | End: 2025-05-31
Attending: EMERGENCY MEDICINE | Admitting: EMERGENCY MEDICINE
Payer: COMMERCIAL

## 2025-05-31 VITALS
RESPIRATION RATE: 20 BRPM | HEART RATE: 98 BPM | TEMPERATURE: 97.2 F | SYSTOLIC BLOOD PRESSURE: 112 MMHG | OXYGEN SATURATION: 98 % | DIASTOLIC BLOOD PRESSURE: 76 MMHG

## 2025-05-31 DIAGNOSIS — O21.9 NAUSEA AND VOMITING DURING PREGNANCY: ICD-10-CM

## 2025-05-31 LAB
ALBUMIN SERPL BCG-MCNC: 3.8 G/DL (ref 3.5–5.2)
ALP SERPL-CCNC: 95 U/L (ref 40–150)
ALT SERPL W P-5'-P-CCNC: 5 U/L (ref 0–50)
ANION GAP SERPL CALCULATED.3IONS-SCNC: 12 MMOL/L (ref 7–15)
AST SERPL W P-5'-P-CCNC: 10 U/L (ref 0–45)
BASOPHILS # BLD AUTO: 0 10E3/UL (ref 0–0.2)
BASOPHILS NFR BLD AUTO: 1 %
BILIRUB SERPL-MCNC: 0.3 MG/DL
BUN SERPL-MCNC: 3.1 MG/DL (ref 6–20)
CALCIUM SERPL-MCNC: 8.9 MG/DL (ref 8.8–10.4)
CHLORIDE SERPL-SCNC: 104 MMOL/L (ref 98–107)
CREAT SERPL-MCNC: 0.44 MG/DL (ref 0.51–0.95)
EGFRCR SERPLBLD CKD-EPI 2021: >90 ML/MIN/1.73M2
EOSINOPHIL # BLD AUTO: 0.1 10E3/UL (ref 0–0.7)
EOSINOPHIL NFR BLD AUTO: 2 %
ERYTHROCYTE [DISTWIDTH] IN BLOOD BY AUTOMATED COUNT: 13.2 % (ref 10–15)
GLUCOSE SERPL-MCNC: 88 MG/DL (ref 70–99)
HCO3 SERPL-SCNC: 20 MMOL/L (ref 22–29)
HCT VFR BLD AUTO: 34.5 % (ref 35–47)
HGB BLD-MCNC: 11.7 G/DL (ref 11.7–15.7)
IMM GRANULOCYTES # BLD: 0 10E3/UL
IMM GRANULOCYTES NFR BLD: 0 %
LYMPHOCYTES # BLD AUTO: 1.6 10E3/UL (ref 0.8–5.3)
LYMPHOCYTES NFR BLD AUTO: 21 %
MAGNESIUM SERPL-MCNC: 1.9 MG/DL (ref 1.7–2.3)
MCH RBC QN AUTO: 31.2 PG (ref 26.5–33)
MCHC RBC AUTO-ENTMCNC: 33.9 G/DL (ref 31.5–36.5)
MCV RBC AUTO: 92 FL (ref 78–100)
MONOCYTES # BLD AUTO: 0.5 10E3/UL (ref 0–1.3)
MONOCYTES NFR BLD AUTO: 7 %
NEUTROPHILS # BLD AUTO: 5.2 10E3/UL (ref 1.6–8.3)
NEUTROPHILS NFR BLD AUTO: 70 %
NRBC # BLD AUTO: 0 10E3/UL
NRBC BLD AUTO-RTO: 0 /100
PLATELET # BLD AUTO: 282 10E3/UL (ref 150–450)
POTASSIUM SERPL-SCNC: 3.5 MMOL/L (ref 3.4–5.3)
PROT SERPL-MCNC: 6.8 G/DL (ref 6.4–8.3)
RBC # BLD AUTO: 3.75 10E6/UL (ref 3.8–5.2)
SODIUM SERPL-SCNC: 136 MMOL/L (ref 135–145)
WBC # BLD AUTO: 7.5 10E3/UL (ref 4–11)

## 2025-05-31 PROCEDURE — 83735 ASSAY OF MAGNESIUM: CPT | Performed by: EMERGENCY MEDICINE

## 2025-05-31 PROCEDURE — 258N000003 HC RX IP 258 OP 636: Performed by: EMERGENCY MEDICINE

## 2025-05-31 PROCEDURE — 85025 COMPLETE CBC W/AUTO DIFF WBC: CPT | Performed by: EMERGENCY MEDICINE

## 2025-05-31 PROCEDURE — 80053 COMPREHEN METABOLIC PANEL: CPT | Performed by: EMERGENCY MEDICINE

## 2025-05-31 PROCEDURE — 250N000011 HC RX IP 250 OP 636: Performed by: EMERGENCY MEDICINE

## 2025-05-31 PROCEDURE — 36415 COLL VENOUS BLD VENIPUNCTURE: CPT | Performed by: EMERGENCY MEDICINE

## 2025-05-31 PROCEDURE — 96361 HYDRATE IV INFUSION ADD-ON: CPT

## 2025-05-31 PROCEDURE — 96374 THER/PROPH/DIAG INJ IV PUSH: CPT

## 2025-05-31 PROCEDURE — 99284 EMERGENCY DEPT VISIT MOD MDM: CPT | Mod: 25

## 2025-05-31 RX ORDER — ONDANSETRON 4 MG/1
4 TABLET, ORALLY DISINTEGRATING ORAL EVERY 8 HOURS PRN
Qty: 10 TABLET | Refills: 0 | Status: SHIPPED | OUTPATIENT
Start: 2025-05-31 | End: 2025-06-03

## 2025-05-31 RX ORDER — ONDANSETRON 2 MG/ML
4 INJECTION INTRAMUSCULAR; INTRAVENOUS EVERY 30 MIN PRN
Status: DISCONTINUED | OUTPATIENT
Start: 2025-05-31 | End: 2025-05-31 | Stop reason: HOSPADM

## 2025-05-31 RX ORDER — DEXTROSE MONOHYDRATE AND SODIUM CHLORIDE 5; .9 G/100ML; G/100ML
INJECTION, SOLUTION INTRAVENOUS CONTINUOUS
Status: DISCONTINUED | OUTPATIENT
Start: 2025-05-31 | End: 2025-05-31 | Stop reason: HOSPADM

## 2025-05-31 RX ADMIN — SODIUM CHLORIDE 1000 ML: 0.9 INJECTION, SOLUTION INTRAVENOUS at 11:03

## 2025-05-31 RX ADMIN — DEXTROSE AND SODIUM CHLORIDE: 5; .9 INJECTION, SOLUTION INTRAVENOUS at 09:17

## 2025-05-31 RX ADMIN — ONDANSETRON 4 MG: 2 INJECTION, SOLUTION INTRAMUSCULAR; INTRAVENOUS at 09:24

## 2025-05-31 ASSESSMENT — COLUMBIA-SUICIDE SEVERITY RATING SCALE - C-SSRS
2. HAVE YOU ACTUALLY HAD ANY THOUGHTS OF KILLING YOURSELF IN THE PAST MONTH?: NO
1. IN THE PAST MONTH, HAVE YOU WISHED YOU WERE DEAD OR WISHED YOU COULD GO TO SLEEP AND NOT WAKE UP?: NO
6. HAVE YOU EVER DONE ANYTHING, STARTED TO DO ANYTHING, OR PREPARED TO DO ANYTHING TO END YOUR LIFE?: NO

## 2025-05-31 ASSESSMENT — ACTIVITIES OF DAILY LIVING (ADL)
ADLS_ACUITY_SCORE: 56

## 2025-05-31 NOTE — PROGRESS NOTES
Called to obtain doptones on pt in ED while she is being seen for nausea/emesis/dehydration. FHT's 150's-pt reports positive fetal movement, and denies uterine contractions.

## 2025-05-31 NOTE — ED PROVIDER NOTES
"  Emergency Department Note      History of Present Illness     Chief Complaint   Emesis During Pregnancy      HPI   Lulu Trujillo is a 29 year old, , 22w4d female with a history of gestational hypertension, postpartum hypertension, and anxiety who presents to the ED today for evaluation of emesis during pregnancy. The patient reports she's been vomiting for the past two days along with tingling throughout her body and blurry vision. She also endorses some diarrhea. She states she felt a bit better yesterday, but \"crashed\" again this morning with the same symptoms. She states the tingling sensation would typically go away with eating and drinking in the past, but she hasn't been able to keep anything down. She states she's been taking Reglan ever 6-8 hours for nausea, but it just lessens the intensity of the vomiting. She reports she's had Zofran in the past, but it causes her to become constipated and she still vomited with it, but not as much. She states she also takes propranolol and Zyrtec regularly. She mentions she had vomiting in her previous pregnancy, but not as much. The patient denies any prior abdominal surgery. She also denies any changes in the baby's movement.     Independent Historian   None    Review of External Notes   Clinic notes    Past Medical History     Medical History and Problem List   Cafe-au-lait spots  Gestational hypertension  Arrest of descent  Postpartum hypertension  Anxiety  ASCUS on pap smear  Spinal muscular atrophy  Fibroadenoma of breast    Medications   Lasix  Nifedipine  Oxycodone  Hydroxyzine  Reglan  Zofran   Propranolol    Surgical History   Adenoidectomy   section  Rhinoplasty     Physical Exam     Patient Vitals for the past 24 hrs:   BP Temp Temp src Pulse Resp SpO2   25 1149 -- -- -- 98 -- --   25 1145 112/76 -- -- -- -- --   25 0830 135/88 97.2  F (36.2  C) Temporal 110 20 98 %     Physical Exam  GENERAL: well developed, " pleasant  HEAD: atraumatic  EYES: pupils reactive, extraocular muscles intact, conjunctivae normal  ENT:  mucus membranes dry  NECK:  trachea midline, normal range of motion  RESPIRATORY: no tachypnea, breath sounds clear to auscultation   CVS: normal S1/S2, no murmurs, intact distal pulses  ABDOMEN: soft, nontender, nondistention  MUSCULOSKELETAL: no deformities  SKIN: warm and dry, no acute rashes or ulceration  NEURO: GCS 15, cranial nerves intact, alert and oriented x3  PSYCH:  Mood/affect normal    Diagnostics     Lab Results   Labs Ordered and Resulted from Time of ED Arrival to Time of ED Departure   COMPREHENSIVE METABOLIC PANEL - Abnormal       Result Value    Sodium 136      Potassium 3.5      Carbon Dioxide (CO2) 20 (*)     Anion Gap 12      Urea Nitrogen 3.1 (*)     Creatinine 0.44 (*)     GFR Estimate >90      Calcium 8.9      Chloride 104      Glucose 88      Alkaline Phosphatase 95      AST 10      ALT 5      Protein Total 6.8      Albumin 3.8      Bilirubin Total 0.3     CBC WITH PLATELETS AND DIFFERENTIAL - Abnormal    WBC Count 7.5      RBC Count 3.75 (*)     Hemoglobin 11.7      Hematocrit 34.5 (*)     MCV 92      MCH 31.2      MCHC 33.9      RDW 13.2      Platelet Count 282      % Neutrophils 70      % Lymphocytes 21      % Monocytes 7      % Eosinophils 2      % Basophils 1      % Immature Granulocytes 0      NRBCs per 100 WBC 0      Absolute Neutrophils 5.2      Absolute Lymphocytes 1.6      Absolute Monocytes 0.5      Absolute Eosinophils 0.1      Absolute Basophils 0.0      Absolute Immature Granulocytes 0.0      Absolute NRBCs 0.0     MAGNESIUM - Normal    Magnesium 1.9       Imaging   No orders to display     Independent Interpretation   None    ED Course      Medications Administered   Medications   dextrose 5% and 0.9% NaCl infusion (0 mLs Intravenous Stopped 5/31/25 1102)   ondansetron (ZOFRAN) injection 4 mg (4 mg Intravenous $Given 5/31/25 0811)   sodium chloride 0.9% BOLUS 1,000 mL (0  mLs Intravenous Stopped 5/31/25 1227)     Discussion of Management   OB/GYN, Dr. Saravia    ED Course   ED Course as of 05/31/25 1248   Sat May 31, 2025   0847 I obtained history and examined the patient as noted above.    1038 I rechecked and updated the patient.    1048 I spoke with Dr. Saravia from OB on the phone regarding the patient.    1245 I rechecked and updated the patient for discharge.      Additional Documentation  None    Medical Decision Making / Diagnosis     CMS Diagnoses: None    MIPS   None               MDM   Lulu Trujillo is a 29 year old female presents with ongoing nausea and vomiting and some mild tingling.  Patient notes she has had difficulty with nausea and vomiting throughout her entire pregnancy.  She notes that Zofran helped but was still of vomiting but having some constipation and feels that the Reglan is causing softer stools but maybe not as controlling of the nausea and vomiting.  Labs are reassuring.  She was given 2 L of fluids.  Did reach out to OB given the second time visit.  They will reach out to her and get her set up with home infusion.  She otherwise has a normal exam and has been pacing up and down the hallways with her mom with the IV bag/pole in tow.    Disposition   The patient was discharged.     Diagnosis     ICD-10-CM    1. Nausea and vomiting during pregnancy  O21.9            Discharge Medications   New Prescriptions    ONDANSETRON (ZOFRAN ODT) 4 MG ODT TAB    Take 1 tablet (4 mg) by mouth every 8 hours as needed for nausea.         Scribe Disclosure:  I, Veronica Gilliland, am serving as a scribe at 9:09 AM on 5/31/2025 to document services personally performed by Tab Plaza MD based on my observations and the provider's statements to me.        Tab Plaza MD  05/31/25 1611

## 2025-05-31 NOTE — ED TRIAGE NOTES
"Pt is 23 weeks pregnant and reports nausea and emesis with \"pins and needles\" in all her extremities for the last 4 days.     Triage Assessment (Adult)       Row Name 05/31/25 0831          Triage Assessment    Airway WDL WDL        Respiratory WDL    Respiratory WDL WDL        Cardiac WDL    Cardiac WDL WDL        Peripheral/Neurovascular WDL    Peripheral Neurovascular WDL WDL        Cognitive/Neuro/Behavioral WDL    Cognitive/Neuro/Behavioral WDL WDL                     "

## 2025-06-02 ENCOUNTER — ENROLLMENT (OUTPATIENT)
Dept: HOME HEALTH SERVICES | Facility: HOME HEALTH | Age: 30
End: 2025-06-02
Payer: COMMERCIAL

## 2025-06-02 DIAGNOSIS — O21.1 HYPEREMESIS GRAVIDARUM WITH METABOLIC DISTURBANCE: Primary | ICD-10-CM

## 2025-06-03 ENCOUNTER — HOME INFUSION (OUTPATIENT)
Dept: HOME HEALTH SERVICES | Facility: HOME HEALTH | Age: 30
End: 2025-06-03
Payer: COMMERCIAL

## 2025-06-03 ENCOUNTER — HOME INFUSION BILLING (OUTPATIENT)
Dept: HOME HEALTH SERVICES | Facility: HOME HEALTH | Age: 30
End: 2025-06-03
Payer: COMMERCIAL

## 2025-06-03 DIAGNOSIS — O21.0 HYPEREMESIS GRAVIDARUM: Primary | ICD-10-CM

## 2025-06-03 PROCEDURE — S9379 HIT NOC PER DIEM: HCPCS

## 2025-06-03 PROCEDURE — E0776 IV POLE: HCPCS

## 2025-06-03 PROCEDURE — A4245 ALCOHOL WIPES PER BOX: HCPCS

## 2025-06-03 PROCEDURE — S1015 IV TUBING EXTENSION SET: HCPCS

## 2025-06-03 PROCEDURE — A4452 WATERPROOF TAPE: HCPCS

## 2025-06-03 PROCEDURE — S9374 HIT HYDRA 1 LITER DIEM: HCPCS | Mod: SH

## 2025-06-03 RX ORDER — METOCLOPRAMIDE HYDROCHLORIDE 5 MG/ML
10 INJECTION INTRAMUSCULAR; INTRAVENOUS EVERY 8 HOURS PRN
Qty: 99999 ML | Refills: 0 | Status: DISPENSED | OUTPATIENT
Start: 2025-06-03 | End: 2026-06-03

## 2025-06-03 NOTE — PROGRESS NOTES
6/3/25 Hyperemesis; hydration daily 1-3 liters PRN via gravity drip and reglan every 8 hours IV push PRN if not tolerating orally. PIV. Per patient, she is not currently tolerating the oral reglan, will be sent by [facility] for IV initially. [facility] for nursing. First delivery to parents home with first visit there also, following visits / deliveries to home address. No issues. Wants first visit Thursday 6/5/25, scheduling informed.      Leonora HELLER, [place]

## 2025-06-04 PROCEDURE — S9374 HIT HYDRA 1 LITER DIEM: HCPCS | Mod: SH

## 2025-06-04 PROCEDURE — S9379 HIT NOC PER DIEM: HCPCS

## 2025-06-05 PROCEDURE — S9379 HIT NOC PER DIEM: HCPCS

## 2025-06-05 PROCEDURE — S9374 HIT HYDRA 1 LITER DIEM: HCPCS | Mod: SH

## 2025-06-06 ENCOUNTER — HOME CARE VISIT (OUTPATIENT)
Dept: HOME HEALTH SERVICES | Facility: HOME HEALTH | Age: 30
End: 2025-06-06
Payer: COMMERCIAL

## 2025-06-06 VITALS
HEART RATE: 90 BPM | OXYGEN SATURATION: 99 % | RESPIRATION RATE: 18 BRPM | DIASTOLIC BLOOD PRESSURE: 74 MMHG | SYSTOLIC BLOOD PRESSURE: 104 MMHG | TEMPERATURE: 97.5 F

## 2025-06-06 PROCEDURE — S9374 HIT HYDRA 1 LITER DIEM: HCPCS | Mod: SH

## 2025-06-06 PROCEDURE — 99602 HOME NFS VISIT EACH ADDL HR: CPT

## 2025-06-06 PROCEDURE — S9379 HIT NOC PER DIEM: HCPCS

## 2025-06-06 PROCEDURE — 99601 HOME NFS VISIT <2 HRS: CPT

## 2025-06-06 RX ORDER — METOCLOPRAMIDE 10 MG/1
10 TABLET ORAL 3 TIMES DAILY PRN
COMMUNITY

## 2025-06-06 RX ORDER — HYDROXYZINE HYDROCHLORIDE 25 MG/1
25 TABLET, FILM COATED ORAL 3 TIMES DAILY PRN
COMMUNITY

## 2025-06-06 RX ORDER — PROPRANOLOL HYDROCHLORIDE 10 MG/1
10 TABLET ORAL 2 TIMES DAILY
COMMUNITY

## 2025-06-07 ENCOUNTER — HOME INFUSION BILLING (OUTPATIENT)
Dept: HOME HEALTH SERVICES | Facility: HOME HEALTH | Age: 30
End: 2025-06-07
Payer: COMMERCIAL

## 2025-06-07 ENCOUNTER — TELEPHONE (OUTPATIENT)
Dept: HOME HEALTH SERVICES | Facility: HOME HEALTH | Age: 30
End: 2025-06-07
Payer: COMMERCIAL

## 2025-06-07 PROCEDURE — E0776 IV POLE: HCPCS

## 2025-06-07 PROCEDURE — S9379 HIT NOC PER DIEM: HCPCS

## 2025-06-07 PROCEDURE — S9374 HIT HYDRA 1 LITER DIEM: HCPCS | Mod: SH

## 2025-06-07 NOTE — PROGRESS NOTES
Nursing Visit Note:  Nurse visit today for SOC, PIV start, IVP antiemetic teach, and gravity IVF teach for Lulu Trujillo.     present during visit today: Not Applicable.    Intravenous Access:  Peripheral IV placed.    Education Provided:     Patient Education focused on IVP Reglan and gravity IVF. This writer discussed aseptic technique, line care, attaching to hub, SAS protocol, flushing q12hrs, med checking, IVF setup, IVF troubleshooting, drawing med from vial, admin IVP over 5-10mins, sharps container use, s/s of PIV complications and how to remove if necessary, when to contact [facility] vs 911, and the need for a PICC for therapy greater than 2 weeks. Douglas was receptive to teaching and demonstrated skills with minimal queues. She denies the need for further teaching..     Note: Douglas had a pleasant affect and was engaged during the visit. She reports intermittent nausea and 1-6 bouts of emesis a day. She has PO Reglan that she is not always able to keep down - now prescribed IV Reglan in addition as an alternative. Discussed not taking at the same time and Douglas verbalized understanding. She has been drinking several bottles of pedialite and small quantities of brat foods as tolerated. She found IVF beneficial when she was recently in the ED for persistent nausea, and plans to infuse up to 3L daily depending on tolerance and schedule. She had some bouts of dizziness prior to presenting to the ED, but denies experiencing since. She denies any fetal concerns. Afebrile and VSS. PIV placed in right hand on second attempt. Pt prefers FA, but agreed to hand PIV at this time d/t difficulty locating FA veins. She will require US RN for FA PIV if hand is not tolerated well. Welcome booklet discussed and all questions answered prior to leaving.    Saline administered: 30 (ml)    Supply Check:   Does the patient have all the supplies they need for the next visit?  No, Order placed for IV pole    Next visit  plan: 6/10/25 for possible PIV placement and HEG assessment    Sebas Nazario, RN 6/6/2025

## 2025-06-07 NOTE — TELEPHONE ENCOUNTER
Triage Note/Care Coordination    Identify the person you spoke with and their relationship to the patient: patient    Reason for the call: Access device problem/concern      PIV-Related Issues    Type of peripheral device: Short catheter    Describe the problem: Other: Patient states a PIV was placed in her hand.  It is still working but is painful at insertion site.  Was told by the nurse that placed it that she would require an US IV for placement anywhere else.  Requesting US nurse be sent to replace IV.      Outcomes:     What is the plan for ongoing care of this patient: Problem resolved, patient will remain in home - scheduled for visit in am    Was there harm, averted harm, or unexpected death of the patient? No    Does the patient/caregiver verbalize agreement with the plan? YES        Follow-Up Communication    None    Naya Riley RN, BSN, PHN, [facility]  [email or web address]  [phone]

## 2025-06-08 ENCOUNTER — HOME CARE VISIT (OUTPATIENT)
Dept: HOME HEALTH SERVICES | Facility: HOME HEALTH | Age: 30
End: 2025-06-08
Payer: COMMERCIAL

## 2025-06-08 VITALS
DIASTOLIC BLOOD PRESSURE: 60 MMHG | BODY MASS INDEX: 25.04 KG/M2 | RESPIRATION RATE: 18 BRPM | OXYGEN SATURATION: 100 % | SYSTOLIC BLOOD PRESSURE: 110 MMHG | HEART RATE: 105 BPM | TEMPERATURE: 97.7 F | WEIGHT: 124 LBS

## 2025-06-08 PROCEDURE — S9379 HIT NOC PER DIEM: HCPCS

## 2025-06-08 PROCEDURE — S9374 HIT HYDRA 1 LITER DIEM: HCPCS | Mod: SH

## 2025-06-08 PROCEDURE — 99601 HOME NFS VISIT <2 HRS: CPT

## 2025-06-08 NOTE — PROGRESS NOTES
Nursing Visit Note:  Nurse visit today for USpiv hyperemesis  for Lulu Trujillo.     present during visit today: Not Applicable.    Intravenous Access:  Peripheral IV placed.    pt 24 weeks pregnant. nausea, vomiting, unable to keep fluids down. pt started FHI this week. patient s last IV was in right hand and painful to flush and hard to do daily activities. Place new IV via ultrasound flushing well patient infusing IV fluids by end of visit. went over any questions. Patient had father also present. patient states, nausea and vomiting minimal to one a day for the last few days. patient also using Reglan and states works better than Zofran. no further questions will follow up on Friday.     Note:   Saline administered: 20 (ml)    Supply Check:   Does the patient have all the supplies they need for the next visit?  Yes    Next visit plan: 6/13/25 see if patient needs new IV . can do dressing change Friday or Monday if IV is still good.     Janie Ayala RN 6/8/2025

## 2025-06-09 PROCEDURE — S9374 HIT HYDRA 1 LITER DIEM: HCPCS | Mod: SH

## 2025-06-09 PROCEDURE — S9379 HIT NOC PER DIEM: HCPCS

## 2025-06-10 PROCEDURE — S9379 HIT NOC PER DIEM: HCPCS

## 2025-06-10 PROCEDURE — S9374 HIT HYDRA 1 LITER DIEM: HCPCS | Mod: SH

## 2025-06-11 PROCEDURE — S9374 HIT HYDRA 1 LITER DIEM: HCPCS | Mod: SH

## 2025-06-11 PROCEDURE — S9379 HIT NOC PER DIEM: HCPCS

## 2025-06-12 ENCOUNTER — HOME INFUSION (OUTPATIENT)
Dept: HOME HEALTH SERVICES | Facility: HOME HEALTH | Age: 30
End: 2025-06-12
Payer: COMMERCIAL

## 2025-06-12 DIAGNOSIS — O21.0 HYPEREMESIS GRAVIDARUM: Primary | ICD-10-CM

## 2025-06-12 PROCEDURE — S9379 HIT NOC PER DIEM: HCPCS

## 2025-06-12 PROCEDURE — S9374 HIT HYDRA 1 LITER DIEM: HCPCS | Mod: SH

## 2025-06-13 PROCEDURE — S9379 HIT NOC PER DIEM: HCPCS

## 2025-06-13 PROCEDURE — S9374 HIT HYDRA 1 LITER DIEM: HCPCS | Mod: SH

## 2025-06-14 PROCEDURE — S9379 HIT NOC PER DIEM: HCPCS

## 2025-06-14 PROCEDURE — S9374 HIT HYDRA 1 LITER DIEM: HCPCS | Mod: SH

## 2025-06-15 PROCEDURE — S9379 HIT NOC PER DIEM: HCPCS

## 2025-06-15 PROCEDURE — S9374 HIT HYDRA 1 LITER DIEM: HCPCS | Mod: SH

## 2025-06-16 PROCEDURE — S9374 HIT HYDRA 1 LITER DIEM: HCPCS | Mod: SH

## 2025-06-16 PROCEDURE — S9379 HIT NOC PER DIEM: HCPCS

## 2025-06-19 ENCOUNTER — TELEPHONE (OUTPATIENT)
Dept: HOME HEALTH SERVICES | Facility: HOME HEALTH | Age: 30
End: 2025-06-19
Payer: COMMERCIAL

## 2025-06-19 NOTE — TELEPHONE ENCOUNTER
Left messsge re: hyperemesis and hydration therapy. Requested a call back to [phone].    Seda Fleming RN  [facility]  [email or web address]

## 2025-06-19 NOTE — PROGRESS NOTES
This nurse sent patient a message to confirm her visit tomorrow evening and she replied that she is feeling much better and won t need any more visits.

## 2025-06-20 ENCOUNTER — HOME CARE VISIT (OUTPATIENT)
Dept: HOME HEALTH SERVICES | Facility: HOME HEALTH | Age: 30
End: 2025-06-20
Payer: COMMERCIAL

## 2025-07-12 ENCOUNTER — HEALTH MAINTENANCE LETTER (OUTPATIENT)
Age: 30
End: 2025-07-12

## (undated) DEVICE — LINEN TOWEL PACK X5 5464

## (undated) DEVICE — FETAL PILLOW SILICONE BALLOON DEVICE STERILE LF  FP-010-1

## (undated) DEVICE — BLADE CLIPPER 4406

## (undated) DEVICE — ESU GROUND PAD UNIVERSAL W/O CORD

## (undated) DEVICE — PACK C-SECTION LF PL15OTA83B

## (undated) DEVICE — STPL SKIN PROXIMATE 35 WIDE PMW35

## (undated) DEVICE — DRAPE SHEET REV FOLD 3/4 9349

## (undated) DEVICE — LIGHT HANDLE X2

## (undated) DEVICE — SUCTION CANISTER MEDIVAC LINER 3000ML W/LID 65651-530

## (undated) DEVICE — PACK SET-UP STD 9102

## (undated) DEVICE — PREP CHLORAPREP 26ML TINTED ORANGE  260815

## (undated) DEVICE — LINEN BABY BLANKET 5434

## (undated) DEVICE — DRSG KERLIX FLUFFS X5

## (undated) DEVICE — SOL NACL 0.9% IRRIG 1000ML BOTTLE 07138-09

## (undated) DEVICE — PAD CHUX UNDERPAD 23X24" 7136

## (undated) DEVICE — SOL WATER IRRIG 1000ML BOTTLE 07139-09

## (undated) RX ORDER — TRANEXAMIC ACID 10 MG/ML
INJECTION, SOLUTION INTRAVENOUS
Status: DISPENSED
Start: 2023-12-12

## (undated) RX ORDER — MORPHINE SULFATE 1 MG/ML
INJECTION, SOLUTION EPIDURAL; INTRATHECAL; INTRAVENOUS
Status: DISPENSED
Start: 2023-12-12

## (undated) RX ORDER — OXYTOCIN/0.9 % SODIUM CHLORIDE 30/500 ML
PLASTIC BAG, INJECTION (ML) INTRAVENOUS
Status: DISPENSED
Start: 2023-12-12

## (undated) RX ORDER — LIDOCAINE HCL/EPINEPHRINE/PF 2%-1:200K
VIAL (ML) INJECTION
Status: DISPENSED
Start: 2023-12-12